# Patient Record
Sex: FEMALE | Race: BLACK OR AFRICAN AMERICAN | NOT HISPANIC OR LATINO | Employment: FULL TIME | URBAN - METROPOLITAN AREA
[De-identification: names, ages, dates, MRNs, and addresses within clinical notes are randomized per-mention and may not be internally consistent; named-entity substitution may affect disease eponyms.]

---

## 2018-12-15 ENCOUNTER — OFFICE VISIT (OUTPATIENT)
Dept: URGENT CARE | Facility: CLINIC | Age: 33
End: 2018-12-15
Payer: MEDICAID

## 2018-12-15 VITALS
TEMPERATURE: 98 F | DIASTOLIC BLOOD PRESSURE: 94 MMHG | HEART RATE: 78 BPM | OXYGEN SATURATION: 97 % | WEIGHT: 190 LBS | BODY MASS INDEX: 31.65 KG/M2 | SYSTOLIC BLOOD PRESSURE: 156 MMHG | RESPIRATION RATE: 18 BRPM | HEIGHT: 65 IN

## 2018-12-15 DIAGNOSIS — B35.4 RINGWORM OF BODY: Primary | ICD-10-CM

## 2018-12-15 DIAGNOSIS — R05.9 COUGH: ICD-10-CM

## 2018-12-15 DIAGNOSIS — H65.90 FLUID COLLECTION OF MIDDLE EAR: ICD-10-CM

## 2018-12-15 PROCEDURE — 99213 OFFICE O/P EST LOW 20 MIN: CPT | Mod: S$GLB,,, | Performed by: NURSE PRACTITIONER

## 2018-12-15 RX ORDER — FLUCONAZOLE 100 MG/1
100 TABLET ORAL DAILY
Qty: 5 TABLET | Refills: 0 | Status: SHIPPED | OUTPATIENT
Start: 2018-12-15 | End: 2022-10-17

## 2018-12-15 RX ORDER — LEVOCETIRIZINE DIHYDROCHLORIDE 5 MG/1
5 TABLET, FILM COATED ORAL NIGHTLY
Qty: 30 TABLET | Refills: 11 | Status: SHIPPED | OUTPATIENT
Start: 2018-12-15 | End: 2022-10-17

## 2018-12-15 RX ORDER — BENZONATATE 100 MG/1
100 CAPSULE ORAL 3 TIMES DAILY PRN
Qty: 30 CAPSULE | Refills: 1 | Status: SHIPPED | OUTPATIENT
Start: 2018-12-15 | End: 2019-12-15

## 2018-12-15 RX ORDER — HYDROCORTISONE 25 MG/G
CREAM TOPICAL
Refills: 1 | COMMUNITY
Start: 2018-11-05 | End: 2024-02-28

## 2018-12-15 RX ORDER — AMLODIPINE BESYLATE 5 MG/1
TABLET ORAL
Refills: 0 | COMMUNITY
Start: 2018-10-10

## 2018-12-15 RX ORDER — CLOTRIMAZOLE 1 %
CREAM (GRAM) TOPICAL
Refills: 1 | COMMUNITY
Start: 2018-11-05 | End: 2024-02-28

## 2018-12-15 RX ORDER — KETOCONAZOLE 20 MG/G
CREAM TOPICAL
Qty: 30 G | Refills: 1 | Status: SHIPPED | OUTPATIENT
Start: 2018-12-15 | End: 2022-10-17

## 2018-12-15 NOTE — PROGRESS NOTES
"Subjective:       Patient ID: Cecilia De Los Santos is a 33 y.o. female.    Vitals:  height is 5' 5" (1.651 m) and weight is 86.2 kg (190 lb). Her respiration is 18.     Chief Complaint: Ear Fullness (Right Ear ) and Extremity Weakness    Patient states that she was on a plane and has been dealing with ear pressure since. Patient is also complaining of weakness in her right arm, tingling and numbness in arm. Patient states that she does have cold symptoms, but she does not feel right. Patient states that she does not have any facial weakness, or slurred speech. Patient states that she is drained, and has been falling sleep.   Has been taking otc cough medication.  Also has rash on trunk of body      Ear Fullness    There is pain in both ears. This is a new problem. Episode onset: Sunday  The problem occurs constantly. The problem has been gradually worsening. There has been no fever. The pain is at a severity of 0/10. The patient is experiencing no pain. Associated symptoms include coughing, a sore throat and vomiting. Pertinent negatives include no diarrhea, headaches or rash. She has tried nothing for the symptoms. The treatment provided no relief.   Extremity Weakness    The pain is present in the left arm. This is a new problem. The current episode started yesterday. There has been no history of extremity trauma. The problem occurs constantly. The problem has been unchanged. Quality: Numbness  The pain is at a severity of 0/10. The patient is experiencing no pain. Associated symptoms include numbness and tingling. Pertinent negatives include no fever. She has tried nothing for the symptoms. The treatment provided no relief.       Constitution: Negative for chills, fatigue and fever.   HENT: Positive for ear pain, tinnitus, congestion and sore throat.    Neck: Negative for painful lymph nodes.   Cardiovascular: Negative for chest pain and leg swelling.   Eyes: Negative for double vision and blurred vision. "   Respiratory: Positive for cough and sputum production. Negative for shortness of breath.    Gastrointestinal: Positive for vomiting. Negative for nausea and diarrhea.   Genitourinary: Negative for dysuria, frequency, urgency and history of kidney stones.   Musculoskeletal: Negative for joint pain, joint swelling, muscle cramps and muscle ache.   Skin: Negative for color change, pale, rash and bruising.   Allergic/Immunologic: Negative for seasonal allergies.   Neurological: Positive for numbness. Negative for dizziness, history of vertigo, light-headedness, passing out and headaches.   Hematologic/Lymphatic: Negative for swollen lymph nodes.   Psychiatric/Behavioral: Negative for nervous/anxious, sleep disturbance and depression. The patient is not nervous/anxious.        Objective:      Physical Exam   Constitutional: She is oriented to person, place, and time. She appears well-developed and well-nourished. She is cooperative.  Non-toxic appearance. She does not appear ill. No distress.   HENT:   Head: Normocephalic and atraumatic.   Right Ear: Hearing, tympanic membrane, external ear and ear canal normal.   Left Ear: Hearing, external ear and ear canal normal. A middle ear effusion is present.   Nose: Nose normal. No mucosal edema, rhinorrhea or nasal deformity. No epistaxis. Right sinus exhibits no maxillary sinus tenderness and no frontal sinus tenderness. Left sinus exhibits no maxillary sinus tenderness and no frontal sinus tenderness.   Mouth/Throat: Uvula is midline, oropharynx is clear and moist and mucous membranes are normal. No trismus in the jaw. Normal dentition. No uvula swelling. No posterior oropharyngeal erythema.   Eyes: Conjunctivae and lids are normal. Right eye exhibits no discharge. Left eye exhibits no discharge. No scleral icterus.   Sclera clear bilat   Neck: Trachea normal, normal range of motion, full passive range of motion without pain and phonation normal. Neck supple.    Cardiovascular: Normal rate, regular rhythm, normal heart sounds, intact distal pulses and normal pulses.   Pulmonary/Chest: Effort normal and breath sounds normal. No respiratory distress.   Abdominal: Soft. Normal appearance and bowel sounds are normal. She exhibits no distension, no pulsatile midline mass and no mass. There is no tenderness.   Musculoskeletal: Normal range of motion. She exhibits no edema or deformity.   Neurological: She is alert and oriented to person, place, and time. She exhibits normal muscle tone. Coordination normal.   Skin: Skin is warm, dry and intact. She is not diaphoretic. No pallor.        Psychiatric: She has a normal mood and affect. Her speech is normal and behavior is normal. Judgment and thought content normal. Cognition and memory are normal.   Nursing note and vitals reviewed.      Assessment:           Plan:         Cecilia was seen today for ear fullness and extremity weakness.    Diagnoses and all orders for this visit:    Ringworm of body    Cough    Fluid collection of middle ear    Other orders  -     fluconazole (DIFLUCAN) 100 MG tablet; Take 1 tablet (100 mg total) by mouth once daily.  -     benzonatate (TESSALON PERLES) 100 MG capsule; Take 1 capsule (100 mg total) by mouth 3 (three) times daily as needed for Cough.  -     levocetirizine (XYZAL) 5 MG tablet; Take 1 tablet (5 mg total) by mouth every evening.  -     ketoconazole (NIZORAL) 2 % cream; Apply to affected area daily        Education  Pt has been given instructions populated from [employer] database and those entered into patient instructions field and has verbalized understanding of the after visit summary and information contained wherein.    Follow Up  No Follow-up on file..    In Case of Emergency   May go to ER for acute shortness of breath, lightheadedness, fever, or any other emergent complaints or changes in condition.

## 2018-12-15 NOTE — PATIENT INSTRUCTIONS
Ringworm of the Skin    Ringworm is a fungal infection of the skin. Despite the name, a worm doesn't cause it. The cause of ringworm is a fungus that infects the outer layers of the skin. It is also not caused by bed bugs, scabies, or lice. These are totally different.  The medical term for ringworm is tinea. It can affect most parts of your body, although it seems to do better in moist areas of the body and around hair. It can be on almost any part of your body, including:  · Arms, hands, legs, chest, feet, and back  · Scalp  · Beard  · Groin  · Between the toes  Depending on where it is located, sometimes the name changes:  · Tinea capitis (scalp)  · Tinea cruris (groin)  · Tinea corporis (body)  · Tinea pedis (feet)  Causes  Ringworm is very common all over the world, including the [place] It can take less than 1 week up to 2 weeks before you develop the infection after being exposed. So, you may not figure out the exact cause.  It is spread through direct contact with:  · An infected person or animal  · Infected soil, or objects such as towels, clothing, and boothe  Symptoms  At first you might not notice ringworm. Or you may just see a small, red, often raised itchy spot or pimple. Sometimes there may only be one spot. At other times there may be several. Ringworm can look slightly different on different parts of the body, but there are some things are always present:  · Irregular, round, oval or ring-shaped, which is why it's called ringworm  · Clearer or lighter color at the center, since it spreads from the center of the spot outward  · Red or inflamed look  · Raised  · Itchy  · Scaly, dry, or flaky  Home care  Follow these tips to help care for yourself at home:  · Leave it alone. Don't scratch at the rash or pick it. This can increase the chance of infection and scarring.  · Take medicine as prescribed. If you were prescribed a cream, apply it exactly as directed. Make sure to put the cream not just on the  rash, but also on the skin 1 or 2 inches around it. Medicine by mouth is sometimes needed, particularly for ringworm on the scalp. Take it as directed and until your healthcare provider says to stop.  · Keep it from spreading to others. Untreated ringworm of the skin is contagious by skin-to-skin contact. Your child may return to school 2 days after treatment has started.  Prevention  To some degree, prevention depends on what part of your body was affected. In general, the following good hygiene can help.  · Clean up after you get dirty or sweaty, or after using a locker room.  · When possible, dont share boothe and brushes.  · Avoid having your skin and feet wet or damp for long periods.  · Wear clean, loose-fitting underwear.  Follow-up care  Follow up with your healthcare provider as advised by our staff if the rash does not improve after 10 days of treatment or if the rash spreads to other areas of the body.  When to seek medical advice  Call your healthcare provider right away if any of these occur:  · Redness around the rash gets worse  · Fluid drains from the rash  · Fever of 100.4ºF (38ºC) or higher, or as directed by your healthcare provider  Date Last Reviewed: 8/1/2016  © 6180-4890 [employer]. [address] [place] [address], [place] [address]. All rights reserved. This information is not intended as a substitute for professional medical care. Always follow your healthcare professional's instructions.        Common Middle Ear Problems    Your middle ear may have been injured or infected recently. Over time, certain growths or bone disease can also harm the middle ear. Left untreated, middle ear problems often lead to lifelong hearing loss. There are two types of hearing loss: conductive and sensorineural. One or both kinds can occur. Injury, infection, certain growths, or bone disease can cause your symptoms. A ruptured eardrum or a long-lasting (chronic) ear infection may be painful and decrease  hearing.  Symptoms  · Hearing loss in one or both ears  · Fluid, often smelly, draining from the ear  · Pain, pressure, or discomfort in the ear  · Ringing in the ear  Conductive and sensorineural hearing loss  Sound waves may be disrupted before they reach the inner ear. If this happens, conductive hearing loss may occur. The ear canal can be blocked by wax, infection, a tumor, or a foreign object. The eardrum can be injured or infected. Abnormal bone growth, infection, or tumors in the middle ear can block sound waves.  Sound waves may not be processed correctly in the inner ear. If this happens, sensorineural hearing loss may occur. Permanent hearing loss is most commonly associated with sensorineural problems.  The tests and evaluations used to diagnose what type of hearing problem you have will depend on your symptoms.   Date Last Reviewed: 10/1/2016  © 0510-2500 [employer]. [address] [place] [address], [place] [address]. All rights reserved. This information is not intended as a substitute for professional medical care. Always follow your healthcare professional's instructions.

## 2019-11-26 ENCOUNTER — OFFICE VISIT (OUTPATIENT)
Dept: URGENT CARE | Facility: CLINIC | Age: 34
End: 2019-11-26
Payer: MEDICAID

## 2019-11-26 VITALS
DIASTOLIC BLOOD PRESSURE: 93 MMHG | WEIGHT: 190 LBS | BODY MASS INDEX: 31.65 KG/M2 | HEART RATE: 75 BPM | OXYGEN SATURATION: 100 % | TEMPERATURE: 98 F | SYSTOLIC BLOOD PRESSURE: 150 MMHG | HEIGHT: 65 IN | RESPIRATION RATE: 18 BRPM

## 2019-11-26 DIAGNOSIS — R30.0 DYSURIA: ICD-10-CM

## 2019-11-26 DIAGNOSIS — R21 RASH: ICD-10-CM

## 2019-11-26 DIAGNOSIS — I10 ESSENTIAL HYPERTENSION: ICD-10-CM

## 2019-11-26 DIAGNOSIS — N30.00 ACUTE CYSTITIS WITHOUT HEMATURIA: ICD-10-CM

## 2019-11-26 DIAGNOSIS — R35.0 FREQUENCY OF URINATION: Primary | ICD-10-CM

## 2019-11-26 LAB
BILIRUB UR QL STRIP: NEGATIVE
GLUCOSE UR QL STRIP: NEGATIVE
KETONES UR QL STRIP: NEGATIVE
LEUKOCYTE ESTERASE UR QL STRIP: POSITIVE
PH, POC UA: 7.5 (ref 5–8)
POC BLOOD, URINE: POSITIVE
POC NITRATES, URINE: NEGATIVE
PROT UR QL STRIP: NEGATIVE
SP GR UR STRIP: 1.02 (ref 1–1.03)
UROBILINOGEN UR STRIP-ACNC: ABNORMAL (ref 0.1–1.1)

## 2019-11-26 PROCEDURE — 99214 PR OFFICE/OUTPT VISIT, EST, LEVL IV, 30-39 MIN: ICD-10-PCS | Mod: 25,S$GLB,, | Performed by: FAMILY MEDICINE

## 2019-11-26 PROCEDURE — 81003 POCT URINALYSIS, DIPSTICK, AUTOMATED, W/O SCOPE: ICD-10-PCS | Mod: QW,S$GLB,, | Performed by: FAMILY MEDICINE

## 2019-11-26 PROCEDURE — 81003 URINALYSIS AUTO W/O SCOPE: CPT | Mod: QW,S$GLB,, | Performed by: FAMILY MEDICINE

## 2019-11-26 PROCEDURE — 99214 OFFICE O/P EST MOD 30 MIN: CPT | Mod: 25,S$GLB,, | Performed by: FAMILY MEDICINE

## 2019-11-26 RX ORDER — LISINOPRIL 10 MG/1
10 TABLET ORAL DAILY
Qty: 90 TABLET | Refills: 3 | Status: SHIPPED | OUTPATIENT
Start: 2019-11-26 | End: 2024-02-28

## 2019-11-26 RX ORDER — DEXAMETHASONE SODIUM PHOSPHATE 100 MG/10ML
10 INJECTION INTRAMUSCULAR; INTRAVENOUS
Status: COMPLETED | OUTPATIENT
Start: 2019-11-26 | End: 2019-11-26

## 2019-11-26 RX ORDER — TRIAMCINOLONE ACETONIDE 1 MG/G
CREAM TOPICAL 2 TIMES DAILY
Qty: 80 G | Refills: 1 | Status: SHIPPED | OUTPATIENT
Start: 2019-11-26 | End: 2024-02-28

## 2019-11-26 RX ORDER — NITROFURANTOIN 25; 75 MG/1; MG/1
100 CAPSULE ORAL 2 TIMES DAILY
Qty: 14 CAPSULE | Refills: 0 | Status: SHIPPED | OUTPATIENT
Start: 2019-11-26 | End: 2019-12-03

## 2019-11-26 RX ADMIN — DEXAMETHASONE SODIUM PHOSPHATE 10 MG: 100 INJECTION INTRAMUSCULAR; INTRAVENOUS at 07:11

## 2019-11-27 NOTE — PROGRESS NOTES
"Subjective:       Patient ID: eCcilia De Los Santos is a 34 y.o. female.    Vitals:  height is 5' 5" (1.651 m) and weight is 86.2 kg (190 lb). Her temperature is 98.3 °F (36.8 °C). Her blood pressure is 150/93 (abnormal) and her pulse is 75. Her respiration is 18 and oxygen saturation is 100%.     Chief Complaint: Hypertension    Pt is also having some other symptoms like rash, urine frequency, and headaches. She said she is also forgetting things and is fatigued.  Patient does admit stress at present  34-year-old black female with complains of a rash on the face for last few days itching also has a rash on the abdomen and a right lower leg which has been present for over 2 weeks no itching or pain at that he does areas patient also complains of her blood pressure has been elevated and feels some headache in unusual feeling denies any nausea vomiting denies any chest pain patient also complains of having urgency and frequency in urination for last couple days denies any vaginal discharge patient is sexually inactive     Hypertension   This is a new problem. The current episode started yesterday. The problem is unchanged. The problem is controlled. Associated symptoms include headaches. Pertinent negatives include no blurred vision, chest pain or shortness of breath. There are no associated agents to hypertension. Treatments tried: amlodipine. The current treatment provides mild improvement. There are no compliance problems.        Constitution: Positive for fatigue. Negative for chills and fever.   HENT: Negative for congestion and sore throat.    Neck: Negative for painful lymph nodes.   Cardiovascular: Negative for chest pain and leg swelling.   Eyes: Negative for double vision and blurred vision.   Respiratory: Negative for cough and shortness of breath.    Gastrointestinal: Negative for nausea, vomiting and diarrhea.   Genitourinary: Positive for frequency and vaginal odor. Negative for dysuria, urgency and history " of kidney stones.   Musculoskeletal: Negative for joint pain, joint swelling, muscle cramps and muscle ache.   Skin: Positive for rash. Negative for color change, pale and bruising.   Allergic/Immunologic: Negative for seasonal allergies.   Neurological: Positive for headaches. Negative for dizziness, history of vertigo, light-headedness and passing out.   Hematologic/Lymphatic: Negative for swollen lymph nodes.   Psychiatric/Behavioral: Negative for nervous/anxious, sleep disturbance and depression. The patient is not nervous/anxious.        Objective:      Physical Exam   Constitutional: She is oriented to person, place, and time. She appears well-developed and well-nourished. She is cooperative.  Non-toxic appearance. She does not appear ill. No distress.   HENT:   Head: Normocephalic and atraumatic.   Right Ear: Hearing, tympanic membrane, external ear and ear canal normal.   Left Ear: Hearing, tympanic membrane, external ear and ear canal normal.   Nose: Nose normal. No mucosal edema, rhinorrhea or nasal deformity. No epistaxis. Right sinus exhibits no maxillary sinus tenderness and no frontal sinus tenderness. Left sinus exhibits no maxillary sinus tenderness and no frontal sinus tenderness.   Mouth/Throat: Uvula is midline, oropharynx is clear and moist and mucous membranes are normal. No trismus in the jaw. Normal dentition. No uvula swelling. No posterior oropharyngeal erythema.   Eyes: Conjunctivae and lids are normal. Right eye exhibits no discharge. Left eye exhibits no discharge. No scleral icterus.   Neck: Trachea normal, normal range of motion, full passive range of motion without pain and phonation normal. Neck supple.   Cardiovascular: Normal rate, regular rhythm, normal heart sounds, intact distal pulses and normal pulses.   Pulmonary/Chest: Effort normal and breath sounds normal. No respiratory distress.   Abdominal: Soft. Normal appearance and bowel sounds are normal. She exhibits no distension,  no pulsatile midline mass and no mass. There is no tenderness.   Musculoskeletal: Normal range of motion. She exhibits no edema or deformity.   Neurological: She is alert and oriented to person, place, and time. She exhibits normal muscle tone. Coordination normal.   Skin: Skin is warm, dry, intact, not diaphoretic and not pale.   Mild papular rash on both cheek areas non vesicular left side of lower abdomen with the scabbed rash right lower leg with excoriated rash no burrows  No itching on the extremities but does admit itching sensation on the face   Psychiatric: She has a normal mood and affect. Her speech is normal and behavior is normal. Judgment and thought content normal. Cognition and memory are normal.   Nursing note and vitals reviewed.        Assessment:       1. Frequency of urination    2. Dysuria    3. Acute cystitis without hematuria    4. Rash    5. Essential hypertension        Plan:         Frequency of urination  -     POCT Urinalysis, Dipstick, Automated, W/O Scope    Dysuria  -     POCT Urinalysis, Dipstick, Automated, W/O Scope  -     nitrofurantoin, macrocrystal-monohydrate, (MACROBID) 100 MG capsule; Take 1 capsule (100 mg total) by mouth 2 (two) times daily. for 7 days  Dispense: 14 capsule; Refill: 0    Acute cystitis without hematuria  -     POCT Urinalysis, Dipstick, Automated, W/O Scope  -     nitrofurantoin, macrocrystal-monohydrate, (MACROBID) 100 MG capsule; Take 1 capsule (100 mg total) by mouth 2 (two) times daily. for 7 days  Dispense: 14 capsule; Refill: 0    Rash  -     dexamethasone injection 10 mg    Essential hypertension  -     lisinopril 10 MG tablet; Take 1 tablet (10 mg total) by mouth once daily.  Dispense: 90 tablet; Refill: 3          Results for orders placed or performed in visit on 11/26/19   POCT Urinalysis, Dipstick, Automated, W/O Scope   Result Value Ref Range    POC Blood, Urine Positive (A) Negative    POC Bilirubin, Urine Negative Negative    POC  Urobilinogen, Urine norm 0.1 - 1.1    POC Ketones, Urine Negative Negative    POC Protein, Urine Negative Negative    POC Nitrates, Urine Negative Negative    POC Glucose, Urine Negative Negative    pH, UA 7.5 5 - 8    POC Specific Gravity, Urine 1.020 1.003 - 1.029    POC Leukocytes, Urine Positive (A) Negative        Pt advised to follow up with PCP

## 2019-11-27 NOTE — PATIENT INSTRUCTIONS
Established High Blood Pressure    High blood pressure (hypertension) is a chronic disease. Often, healthcare providers dont know what causes it. But it can be caused by certain health conditions and medicines.  If you have high blood pressure, you may not have any symptoms. If you do have symptoms, they may include headache, dizziness, changes in your vision, chest pain, and shortness of breath. But even without symptoms, high blood pressure thats not treated raises your risk for heart attack and stroke. High blood pressure is a serious health risk and shouldnt be ignored.  A blood pressure reading is made up of two numbers: a higher number over a lower number. The top number is the systolic pressure. The bottom number is the diastolic pressure. A normal blood pressure is a systolic pressure of  less than 120 over a diastolic pressure of less than 80. You will see your blood pressure readings written together. For example, a person with a systolic pressure of 188 and a diastolic pressure of 78 will have 118/78 written in the medical record.  High blood pressure is when either the top number is 140 or higher, or the bottom number is 90 or higher. This must be the result when taking your blood pressure a number of times. The blood pressures between normal and high are called prehypertension.  Home care  If you have high blood pressure, you should do what is listed below to lower your blood pressure. If you are taking medicines for high blood pressure, these methods may reduce or end your need for medicines in the future.  · Begin a weight-loss program if you are overweight.  · Cut back on how much salt you get in your diet. Heres how to do this:  ¨ Dont eat foods that have a lot of salt. These include olives, pickles, smoked meats, and salted potato chips.  ¨ Dont add salt to your food at the table.  ¨ Use only small amounts of salt when cooking.  · Start an exercise program. Talk with your healthcare  provider about the type of exercise program that would be best for you. It doesn't have to be hard. Even brisk walking for 20 minutes 3 times a week is a good form of exercise.  · Dont take medicines that stimulate the heart. This includes many over-the-counter cold and sinus decongestant pills and sprays, as well as diet pills. Check the warnings about hypertension on the label. Before buying any over-the-counter medicines or supplements, always ask the pharmacist about the product's potential interaction with your high blood pressure and your high blood pressure medicines.  · Stimulants such as amphetamine or cocaine could be deadly for someone with high blood pressure. Never take these.  · Limit how much caffeine you get in your diet. Switch to caffeine-free products.  · Stop smoking. If you are a long-time smoker, this can be hard. Talk to your healthcare provider about medicines and nicotine replacement options to help you. Also, enroll in a stop-smoking program to make it more likely that you will quit for good.  · Learn how to handle stress. This is an important part of any program to lower blood pressure. Learn about relaxation methods like meditation, yoga, or biofeedback.  · If your provider prescribed medicines, take them exactly as directed. Missing doses may cause your blood pressure get out of control.  · If you miss a dose or doses, check with your healthcare provider or pharmacist about what to do.  · Consider buying an automatic blood pressure machine. Ask your provider for a recommendation. You can get one of these at most pharmacies.     The American Heart Association recommends the following guidelines for home blood pressure monitoring:  · Don't smoke or drink coffee for 30 minutes before taking your blood pressure.  · Go to the bathroom before the test.  · Relax for 5 minutes before taking the measurement.  · Sit with your back supported (don't sit on a couch or soft chair); keep your feet on  the floor uncrossed. Place your arm on a solid flat surface (like a table) with the upper part of the arm at heart level. Place the middle of the cuff directly above the eye of the elbow. Check the monitor's instruction manual for an illustration.  · Take multiple readings. When you measure, take 2 to 3 readings one minute apart and record all of the results.  · Take your blood pressure at the same time every day, or as your healthcare provider recommends.  · Record the date, time, and blood pressure reading.  · Take the record with you to your next medical appointment. If your blood pressure monitor has a built-in memory, simply take the monitor with you to your next appointment.  · Call your provider if you have several high readings. Don't be frightened by a single high blood pressure reading, but if you get several high readings, check in with your healthcare provider.  · Note: When blood pressure reaches a systolic (top number) of 180 or higher OR diastolic (bottom number) of 110 or higher, seek emergency medical treatment.  Follow-up care  You will need to see your healthcare provider regularly. This is to check your blood pressure and to make changes to your medicines. Make a follow-up appointment as directed. Bring the record of your home blood pressure readings to the appointment.  When to seek medical advice  Call your healthcare provider right away if any of these occur:  · Blood pressure reaches a systolic (upper number) of 180 or higher OR a diastolic (bottom number) of 110 or higher  · Chest pain or shortness of breath  · Severe headache  · Throbbing or rushing sound in the ears  · Nosebleed  · Sudden severe pain in your belly (abdomen)  · Extreme drowsiness, confusion, or fainting  · Dizziness or spinning sensation (vertigo)  · Weakness of an arm or leg or one side of the face  · You have problems speaking or seeing   Date Last Reviewed: 12/1/2016  © 7930-7604 [employer]. [address] [place] [address]  [place] [address]. All rights reserved. This information is not intended as a substitute for professional medical care. Always follow your healthcare professional's instructions.

## 2020-02-17 ENCOUNTER — TELEPHONE (OUTPATIENT)
Dept: OPHTHALMOLOGY | Facility: CLINIC | Age: 35
End: 2020-02-17

## 2020-02-17 NOTE — TELEPHONE ENCOUNTER
Left vm and call back number   We do not accept her ins  Received ref from dr walter  Relayed msg to omar at dr walter that we do not accept her ins for cross linking

## 2022-01-08 ENCOUNTER — OFFICE VISIT (OUTPATIENT)
Dept: URGENT CARE | Facility: CLINIC | Age: 37
End: 2022-01-08
Payer: MEDICAID

## 2022-01-08 VITALS
OXYGEN SATURATION: 95 % | TEMPERATURE: 96 F | DIASTOLIC BLOOD PRESSURE: 90 MMHG | SYSTOLIC BLOOD PRESSURE: 153 MMHG | HEIGHT: 66 IN | WEIGHT: 211 LBS | BODY MASS INDEX: 33.91 KG/M2 | HEART RATE: 89 BPM | RESPIRATION RATE: 20 BRPM

## 2022-01-08 DIAGNOSIS — Z20.822 EXPOSURE TO COVID-19 VIRUS: Primary | ICD-10-CM

## 2022-01-08 LAB
CTP QC/QA: YES
SARS-COV-2 RDRP RESP QL NAA+PROBE: NEGATIVE

## 2022-01-08 PROCEDURE — 3080F DIAST BP >= 90 MM HG: CPT | Mod: CPTII,S$GLB,, | Performed by: FAMILY MEDICINE

## 2022-01-08 PROCEDURE — 1159F MED LIST DOCD IN RCRD: CPT | Mod: CPTII,S$GLB,, | Performed by: FAMILY MEDICINE

## 2022-01-08 PROCEDURE — 3080F PR MOST RECENT DIASTOLIC BLOOD PRESSURE >= 90 MM HG: ICD-10-PCS | Mod: CPTII,S$GLB,, | Performed by: FAMILY MEDICINE

## 2022-01-08 PROCEDURE — 1159F PR MEDICATION LIST DOCUMENTED IN MEDICAL RECORD: ICD-10-PCS | Mod: CPTII,S$GLB,, | Performed by: FAMILY MEDICINE

## 2022-01-08 PROCEDURE — 1160F PR REVIEW ALL MEDS BY PRESCRIBER/CLIN PHARMACIST DOCUMENTED: ICD-10-PCS | Mod: CPTII,S$GLB,, | Performed by: FAMILY MEDICINE

## 2022-01-08 PROCEDURE — 3008F PR BODY MASS INDEX (BMI) DOCUMENTED: ICD-10-PCS | Mod: CPTII,S$GLB,, | Performed by: FAMILY MEDICINE

## 2022-01-08 PROCEDURE — 3077F SYST BP >= 140 MM HG: CPT | Mod: CPTII,S$GLB,, | Performed by: FAMILY MEDICINE

## 2022-01-08 PROCEDURE — U0002: ICD-10-PCS | Mod: QW,S$GLB,, | Performed by: FAMILY MEDICINE

## 2022-01-08 PROCEDURE — U0002 COVID-19 LAB TEST NON-CDC: HCPCS | Mod: QW,S$GLB,, | Performed by: FAMILY MEDICINE

## 2022-01-08 PROCEDURE — 1160F RVW MEDS BY RX/DR IN RCRD: CPT | Mod: CPTII,S$GLB,, | Performed by: FAMILY MEDICINE

## 2022-01-08 PROCEDURE — 99213 PR OFFICE/OUTPT VISIT, EST, LEVL III, 20-29 MIN: ICD-10-PCS | Mod: S$GLB,,, | Performed by: FAMILY MEDICINE

## 2022-01-08 PROCEDURE — 3077F PR MOST RECENT SYSTOLIC BLOOD PRESSURE >= 140 MM HG: ICD-10-PCS | Mod: CPTII,S$GLB,, | Performed by: FAMILY MEDICINE

## 2022-01-08 PROCEDURE — 99213 OFFICE O/P EST LOW 20 MIN: CPT | Mod: S$GLB,,, | Performed by: FAMILY MEDICINE

## 2022-01-08 PROCEDURE — 3008F BODY MASS INDEX DOCD: CPT | Mod: CPTII,S$GLB,, | Performed by: FAMILY MEDICINE

## 2022-01-08 NOTE — PATIENT INSTRUCTIONS
 CDC Testing and Quarantine Guidelines for patients with exposure to a known-positive COVID-19 person:   A 'close exposure' is defined as anyone who has had an exposure (masked or unmasked) to a known COVID -19 positive person    within 6 feet of someone    for a cumulative total of 15 minutes or more over a 24-hour period.    vaccinated and/or had a positive test within 90 days    do NOT need to quarantine after contact with someone who had COVID-19 unless they have symptoms.    fully vaccinated people who have not had a positive test within 90 days, should get tested 3-5 days after their exposure, even if they don't have symptoms and wear a mask indoors in public for 10 days following exposure or until their test result is negative.      Unvaccinated, or are more than six months out from their second mRNA dose (or more than 2 months after the J&J vaccine) and not yet boosted,  and/or NOT had a positive test within 90 days and meet 'close exposure'   you are required by CDC guidelines to quarantine for at least 5 days from time of exposure followed by 5 days of strict masking. It is recommended, but not required to test after 5 days, unless you develop symptoms, in which case you should test at that time.   If you do decide to test at 5 days and are asymptomatic, the risk is that if you test without symptoms on Day 5 for example) and you are positive, your 5 day isolation begins on that day, and you turned your 5 day quarantine into 10 days.   If your exposure does not meet the above definition, you can return to your normal daily activities to include social distancing, wearing a mask and frequent handwashing.   Alternatively, if a 5-day quarantine is not feasible, it is imperative that an exposed person wear a well-fitting mask at all times when around others for 10 days after exposure..

## 2022-01-08 NOTE — PROGRESS NOTES
"Subjective:       Patient ID: Cecilia De Los Santos is a 36 y.o. female.    Vitals:  height is 5' 6" (1.676 m) and weight is 95.7 kg (211 lb). Her temperature is 96.3 °F (35.7 °C). Her blood pressure is 153/90 (abnormal) and her pulse is 89. Her respiration is 20 and oxygen saturation is 95%.     Chief Complaint: Cough    Close exposure to COVId    C/o cough, HA    Cough  This is a new problem. The current episode started in the past 7 days. The problem has been unchanged. The cough is non-productive. Associated symptoms include headaches. Pertinent negatives include no chest pain, chills, ear congestion, ear pain, fever, heartburn, hemoptysis, myalgias, nasal congestion, postnasal drip, rash, rhinorrhea, sore throat, shortness of breath, sweats, weight loss or wheezing. She has tried nothing for the symptoms. There is no history of asthma, bronchiectasis, bronchitis, COPD, emphysema, environmental allergies or pneumonia.       Constitution: Negative for chills and fever.   HENT: Negative for ear pain, postnasal drip and sore throat.    Cardiovascular: Negative for chest pain.   Respiratory: Positive for cough. Negative for bloody sputum, shortness of breath and wheezing.    Gastrointestinal: Negative for heartburn.   Musculoskeletal: Negative for muscle ache.   Skin: Negative for rash.   Allergic/Immunologic: Negative for environmental allergies.   Neurological: Positive for headaches.       Objective:      Physical Exam   Constitutional: She does not appear ill. No distress. obesity  HENT:   Head: Normocephalic and atraumatic.   Nose: Nose normal.   Cardiovascular: Normal rate, regular rhythm, normal heart sounds and normal pulses.   Pulmonary/Chest: Effort normal.   Abdominal: Normal appearance. Soft.   Neurological: She is alert.   Nursing note and vitals reviewed.        Assessment:       1. Exposure to COVID-19 virus        Patient is fully vaccinated and aside from occasional headaches is asymptomatic. No " quarantine apparently indicated.   Plan:         Exposure to COVID-19 virus  -     POCT COVID-19 Rapid Screening

## 2022-01-12 ENCOUNTER — LAB VISIT (OUTPATIENT)
Dept: PRIMARY CARE CLINIC | Facility: CLINIC | Age: 37
End: 2022-01-12
Payer: MEDICAID

## 2022-01-12 DIAGNOSIS — Z20.822 CONTACT WITH AND (SUSPECTED) EXPOSURE TO COVID-19: ICD-10-CM

## 2022-01-12 LAB
CTP QC/QA: YES
SARS-COV-2 AG RESP QL IA.RAPID: NEGATIVE

## 2022-01-12 PROCEDURE — 87811 SARS-COV-2 COVID19 W/OPTIC: CPT

## 2022-10-17 ENCOUNTER — OFFICE VISIT (OUTPATIENT)
Dept: URGENT CARE | Facility: CLINIC | Age: 37
End: 2022-10-17
Payer: MEDICAID

## 2022-10-17 VITALS
DIASTOLIC BLOOD PRESSURE: 80 MMHG | OXYGEN SATURATION: 98 % | SYSTOLIC BLOOD PRESSURE: 140 MMHG | HEIGHT: 65 IN | HEART RATE: 101 BPM | RESPIRATION RATE: 20 BRPM | WEIGHT: 210 LBS | TEMPERATURE: 97 F | BODY MASS INDEX: 34.99 KG/M2

## 2022-10-17 DIAGNOSIS — J02.9 SORE THROAT: ICD-10-CM

## 2022-10-17 DIAGNOSIS — R39.9 UTI SYMPTOMS: ICD-10-CM

## 2022-10-17 DIAGNOSIS — R35.0 URINARY FREQUENCY: ICD-10-CM

## 2022-10-17 DIAGNOSIS — Z20.828 EXPOSURE TO INFLUENZA: Primary | ICD-10-CM

## 2022-10-17 DIAGNOSIS — R51.9 ACUTE NONINTRACTABLE HEADACHE, UNSPECIFIED HEADACHE TYPE: ICD-10-CM

## 2022-10-17 DIAGNOSIS — R05.9 COUGH, UNSPECIFIED TYPE: ICD-10-CM

## 2022-10-17 LAB
B-HCG UR QL: NEGATIVE
BILIRUB UR QL STRIP: NEGATIVE
CTP QC/QA: YES
CTP QC/QA: YES
GLUCOSE UR QL STRIP: NEGATIVE
KETONES UR QL STRIP: NEGATIVE
LEUKOCYTE ESTERASE UR QL STRIP: NEGATIVE
PH, POC UA: 6.5 (ref 5–8)
POC BLOOD, URINE: POSITIVE
POC NITRATES, URINE: NEGATIVE
PROT UR QL STRIP: NEGATIVE
SARS-COV-2 RDRP RESP QL NAA+PROBE: NEGATIVE
SP GR UR STRIP: 1.02 (ref 1–1.03)
UROBILINOGEN UR STRIP-ACNC: POSITIVE (ref 0.1–1.1)

## 2022-10-17 PROCEDURE — 81025 URINE PREGNANCY TEST: CPT | Mod: S$GLB,,, | Performed by: NURSE PRACTITIONER

## 2022-10-17 PROCEDURE — 81025 POCT URINE PREGNANCY: ICD-10-PCS | Mod: S$GLB,,, | Performed by: NURSE PRACTITIONER

## 2022-10-17 PROCEDURE — 3079F PR MOST RECENT DIASTOLIC BLOOD PRESSURE 80-89 MM HG: ICD-10-PCS | Mod: CPTII,S$GLB,, | Performed by: NURSE PRACTITIONER

## 2022-10-17 PROCEDURE — 3077F SYST BP >= 140 MM HG: CPT | Mod: CPTII,S$GLB,, | Performed by: NURSE PRACTITIONER

## 2022-10-17 PROCEDURE — 1160F RVW MEDS BY RX/DR IN RCRD: CPT | Mod: CPTII,S$GLB,, | Performed by: NURSE PRACTITIONER

## 2022-10-17 PROCEDURE — 99205 PR OFFICE/OUTPT VISIT, NEW, LEVL V, 60-74 MIN: ICD-10-PCS | Mod: S$GLB,,, | Performed by: NURSE PRACTITIONER

## 2022-10-17 PROCEDURE — 1160F PR REVIEW ALL MEDS BY PRESCRIBER/CLIN PHARMACIST DOCUMENTED: ICD-10-PCS | Mod: CPTII,S$GLB,, | Performed by: NURSE PRACTITIONER

## 2022-10-17 PROCEDURE — 1159F MED LIST DOCD IN RCRD: CPT | Mod: CPTII,S$GLB,, | Performed by: NURSE PRACTITIONER

## 2022-10-17 PROCEDURE — 99205 OFFICE O/P NEW HI 60 MIN: CPT | Mod: S$GLB,,, | Performed by: NURSE PRACTITIONER

## 2022-10-17 PROCEDURE — 81003 URINALYSIS AUTO W/O SCOPE: CPT | Mod: QW,S$GLB,, | Performed by: NURSE PRACTITIONER

## 2022-10-17 PROCEDURE — 87635: ICD-10-PCS | Mod: QW,S$GLB,, | Performed by: NURSE PRACTITIONER

## 2022-10-17 PROCEDURE — 3079F DIAST BP 80-89 MM HG: CPT | Mod: CPTII,S$GLB,, | Performed by: NURSE PRACTITIONER

## 2022-10-17 PROCEDURE — 87635 SARS-COV-2 COVID-19 AMP PRB: CPT | Mod: QW,S$GLB,, | Performed by: NURSE PRACTITIONER

## 2022-10-17 PROCEDURE — 81003 POCT URINALYSIS, DIPSTICK, AUTOMATED, W/O SCOPE: ICD-10-PCS | Mod: QW,S$GLB,, | Performed by: NURSE PRACTITIONER

## 2022-10-17 PROCEDURE — 3077F PR MOST RECENT SYSTOLIC BLOOD PRESSURE >= 140 MM HG: ICD-10-PCS | Mod: CPTII,S$GLB,, | Performed by: NURSE PRACTITIONER

## 2022-10-17 PROCEDURE — 1159F PR MEDICATION LIST DOCUMENTED IN MEDICAL RECORD: ICD-10-PCS | Mod: CPTII,S$GLB,, | Performed by: NURSE PRACTITIONER

## 2022-10-17 RX ORDER — OSELTAMIVIR PHOSPHATE 75 MG/1
75 CAPSULE ORAL DAILY
Qty: 10 CAPSULE | Refills: 0 | Status: SHIPPED | OUTPATIENT
Start: 2022-10-17 | End: 2022-10-27

## 2022-10-18 NOTE — PROGRESS NOTES
"Subjective:       Patient ID: Cecilia De Los Santos is a 37 y.o. female.    Vitals:  height is 5' 5" (1.651 m) and weight is 95.3 kg (210 lb). Her tympanic temperature is 97.3 °F (36.3 °C). Her blood pressure is 140/80 (abnormal) and her pulse is 101. Her respiration is 20 and oxygen saturation is 98%.     Chief Complaint: Sore Throat (Watery eyes, headache, nasal congestion) and Urinary Tract Infection    This is a 37 y.o. female who presents today with a chief complaint of cold symptoms that began today after a student sneeze on her, no med's were taken. Pt explain that UTI symptoms began last week.    Patient reports that a student at least on her today and she almost instantly started having weird feelings as if she may have the flu.  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 The current episode started today. The problem has been unchanged. There has been no fever. The fever has been present for Less than 1 day. The pain is at a severity of 0/10. The patient is experiencing no pain. Associated symptoms include headaches. She has tried nothing for the symptoms. The treatment provided no relief.   Urinary Tract Infection   This is a new problem. The current episode started 1 to 4 weeks ago. The problem occurs every urination. The problem has been unchanged. The quality of the pain is described as aching. The pain is at a severity of 0/10. The patient is experiencing no pain. There has been no fever. She is Sexually active. There is No history of pyelonephritis. Associated symptoms include frequency. She has tried nothing for the symptoms. The treatment provided no relief.     HENT:  Positive for sore throat.    Genitourinary:  Positive for frequency.   Neurological:  Positive for headaches.     Objective:      Physical Exam   Constitutional: She is oriented to person, " place, and time. She appears well-developed. She is cooperative.  Non-toxic appearance. She does not appear ill. No distress.   HENT:   Head: Normocephalic and atraumatic.   Ears:   Right Ear: Hearing, tympanic membrane, external ear and ear canal normal.   Left Ear: Hearing, tympanic membrane, external ear and ear canal normal.   Nose: Nose normal. No mucosal edema, rhinorrhea or nasal deformity. No epistaxis. Right sinus exhibits no maxillary sinus tenderness and no frontal sinus tenderness. Left sinus exhibits no maxillary sinus tenderness and no frontal sinus tenderness.   Mouth/Throat: Uvula is midline, oropharynx is clear and moist and mucous membranes are normal. No trismus in the jaw. Normal dentition. No uvula swelling. No oropharyngeal exudate, posterior oropharyngeal edema or posterior oropharyngeal erythema.   Eyes: Conjunctivae and lids are normal. No scleral icterus.   Neck: Trachea normal and phonation normal. Neck supple. No edema present. No erythema present. No neck rigidity present.   Cardiovascular: Normal rate, regular rhythm, normal heart sounds and normal pulses.   Pulmonary/Chest: Effort normal and breath sounds normal. No respiratory distress. She has no decreased breath sounds. She has no rhonchi.   Abdominal: Normal appearance. There is no left CVA tenderness and no right CVA tenderness.   Musculoskeletal: Normal range of motion.         General: No deformity. Normal range of motion.   Neurological: She is alert and oriented to person, place, and time. She exhibits normal muscle tone. Coordination normal.   Skin: Skin is warm, dry, intact, not diaphoretic and not pale.   Psychiatric: Her speech is normal and behavior is normal. Judgment and thought content normal.   Nursing note and vitals reviewed.      Results for orders placed or performed in visit on 10/17/22   POCT COVID-19 Rapid Screening   Result Value Ref Range    POC Rapid COVID Negative Negative     Acceptable Yes     POCT Urinalysis, Dipstick, Automated, W/O Scope   Result Value Ref Range    POC Blood, Urine Positive (A) Negative    POC Bilirubin, Urine Negative Negative    POC Urobilinogen, Urine Positive 0.1 - 1.1    POC Ketones, Urine Negative Negative    POC Protein, Urine Negative Negative    POC Nitrates, Urine Negative Negative    POC Glucose, Urine Negative Negative    pH, UA 6.5 5 - 8    POC Specific Gravity, Urine 1.020 1.003 - 1.029    POC Leukocytes, Urine Negative Negative   POCT urine pregnancy   Result Value Ref Range    POC Preg Test, Ur Negative Negative     Acceptable Yes        Assessment:       1. Exposure to influenza    2. Urinary frequency    3. UTI symptoms    4. Sore throat    5. Acute nonintractable headache, unspecified headache type    6. Cough, unspecified type            Plan:         Exposure to influenza  -     oseltamivir (TAMIFLU) 75 MG capsule; Take 1 capsule (75 mg total) by mouth once daily. for 10 days  Dispense: 10 capsule; Refill: 0    Urinary frequency  -     POCT Urinalysis, Dipstick, Automated, W/O Scope  -     POCT urine pregnancy    UTI symptoms    Sore throat    Acute nonintractable headache, unspecified headache type    Cough, unspecified type  -     POCT COVID-19 Rapid Screening            Patient Instructions   Please follow up with your Primary care provider within 2-5 days if your signs and symptoms have not resolved or worsen.     If your condition worsens or fails to improve we recommend that you receive another evaluation at the emergency room immediately or contact your primary medical clinic to discuss your concerns.   You must understand that you have received an Urgent Care treatment only and that you may be released before all of your medical problems are known or treated. You, the patient, will arrange for follow up care as instructed.     RED FLAGS/WARNING SYMPTOMS DISCUSSED WITH PATIENT THAT WOULD WARRANT EMERGENT MEDICAL ATTENTION. PATIENT VERBALIZED  UNDERSTANDING.

## 2023-03-04 ENCOUNTER — OFFICE VISIT (OUTPATIENT)
Dept: URGENT CARE | Facility: CLINIC | Age: 38
End: 2023-03-04
Payer: MEDICAID

## 2023-03-04 VITALS
HEART RATE: 89 BPM | HEIGHT: 65 IN | WEIGHT: 210 LBS | OXYGEN SATURATION: 97 % | RESPIRATION RATE: 19 BRPM | TEMPERATURE: 99 F | DIASTOLIC BLOOD PRESSURE: 84 MMHG | SYSTOLIC BLOOD PRESSURE: 132 MMHG | BODY MASS INDEX: 34.99 KG/M2

## 2023-03-04 DIAGNOSIS — J02.9 ACUTE PHARYNGITIS, UNSPECIFIED ETIOLOGY: ICD-10-CM

## 2023-03-04 DIAGNOSIS — J02.9 SORE THROAT: Primary | ICD-10-CM

## 2023-03-04 DIAGNOSIS — B30.9 ACUTE VIRAL CONJUNCTIVITIS OF RIGHT EYE: ICD-10-CM

## 2023-03-04 LAB
CTP QC/QA: YES
CTP QC/QA: YES
MOLECULAR STREP A: NEGATIVE
SARS-COV-2 AG RESP QL IA.RAPID: NEGATIVE

## 2023-03-04 PROCEDURE — 87811 SARS-COV-2 COVID19 W/OPTIC: CPT | Mod: QW,S$GLB,, | Performed by: FAMILY MEDICINE

## 2023-03-04 PROCEDURE — 3008F PR BODY MASS INDEX (BMI) DOCUMENTED: ICD-10-PCS | Mod: CPTII,S$GLB,, | Performed by: FAMILY MEDICINE

## 2023-03-04 PROCEDURE — 3008F BODY MASS INDEX DOCD: CPT | Mod: CPTII,S$GLB,, | Performed by: FAMILY MEDICINE

## 2023-03-04 PROCEDURE — 87651 STREP A DNA AMP PROBE: CPT | Mod: QW,S$GLB,, | Performed by: FAMILY MEDICINE

## 2023-03-04 PROCEDURE — 3075F PR MOST RECENT SYSTOLIC BLOOD PRESS GE 130-139MM HG: ICD-10-PCS | Mod: CPTII,S$GLB,, | Performed by: FAMILY MEDICINE

## 2023-03-04 PROCEDURE — 3079F DIAST BP 80-89 MM HG: CPT | Mod: CPTII,S$GLB,, | Performed by: FAMILY MEDICINE

## 2023-03-04 PROCEDURE — 87651 POCT STREP A MOLECULAR: ICD-10-PCS | Mod: QW,S$GLB,, | Performed by: FAMILY MEDICINE

## 2023-03-04 PROCEDURE — 3075F SYST BP GE 130 - 139MM HG: CPT | Mod: CPTII,S$GLB,, | Performed by: FAMILY MEDICINE

## 2023-03-04 PROCEDURE — 99214 PR OFFICE/OUTPT VISIT, EST, LEVL IV, 30-39 MIN: ICD-10-PCS | Mod: S$GLB,,, | Performed by: FAMILY MEDICINE

## 2023-03-04 PROCEDURE — 1159F PR MEDICATION LIST DOCUMENTED IN MEDICAL RECORD: ICD-10-PCS | Mod: CPTII,S$GLB,, | Performed by: FAMILY MEDICINE

## 2023-03-04 PROCEDURE — 99214 OFFICE O/P EST MOD 30 MIN: CPT | Mod: S$GLB,,, | Performed by: FAMILY MEDICINE

## 2023-03-04 PROCEDURE — 3079F PR MOST RECENT DIASTOLIC BLOOD PRESSURE 80-89 MM HG: ICD-10-PCS | Mod: CPTII,S$GLB,, | Performed by: FAMILY MEDICINE

## 2023-03-04 PROCEDURE — 87811 SARS CORONAVIRUS 2 ANTIGEN POCT, MANUAL READ: ICD-10-PCS | Mod: QW,S$GLB,, | Performed by: FAMILY MEDICINE

## 2023-03-04 PROCEDURE — 1159F MED LIST DOCD IN RCRD: CPT | Mod: CPTII,S$GLB,, | Performed by: FAMILY MEDICINE

## 2023-03-04 RX ORDER — LINACLOTIDE 290 UG/1
290 CAPSULE, GELATIN COATED ORAL
COMMUNITY
Start: 2023-02-23

## 2023-03-04 RX ORDER — CIPROFLOXACIN 250 MG/1
250 TABLET, FILM COATED ORAL EVERY 12 HOURS
COMMUNITY
Start: 2022-11-05 | End: 2024-02-28

## 2023-03-04 RX ORDER — AMOXICILLIN 875 MG/1
875 TABLET, FILM COATED ORAL 2 TIMES DAILY
Qty: 20 TABLET | Refills: 0 | Status: SHIPPED | OUTPATIENT
Start: 2023-03-04 | End: 2023-03-14

## 2023-03-04 RX ORDER — IBUPROFEN 800 MG/1
800 TABLET ORAL
COMMUNITY
Start: 2022-11-05 | End: 2023-03-16 | Stop reason: ALTCHOICE

## 2023-03-04 RX ORDER — LORATADINE 10 MG/1
10 TABLET ORAL DAILY
Qty: 30 TABLET | Refills: 2 | Status: SHIPPED | OUTPATIENT
Start: 2023-03-04 | End: 2024-02-28

## 2023-03-04 RX ORDER — METHOCARBAMOL 750 MG/1
750 TABLET, FILM COATED ORAL EVERY 6 HOURS PRN
COMMUNITY
Start: 2023-02-03 | End: 2023-03-16 | Stop reason: ALTCHOICE

## 2023-03-04 RX ORDER — IBUPROFEN 600 MG/1
600 TABLET ORAL
COMMUNITY
Start: 2023-02-03 | End: 2023-03-16 | Stop reason: ALTCHOICE

## 2023-03-04 RX ORDER — METRONIDAZOLE 500 MG/1
500 TABLET ORAL 2 TIMES DAILY
COMMUNITY
Start: 2023-02-27 | End: 2024-02-28

## 2023-03-04 RX ORDER — GENTAMICIN SULFATE 3 MG/ML
1 SOLUTION/ DROPS OPHTHALMIC EVERY 4 HOURS
Qty: 5 ML | Refills: 0 | Status: SHIPPED | OUTPATIENT
Start: 2023-03-04 | End: 2024-02-28

## 2023-03-04 RX ORDER — DOXYCYCLINE 100 MG/1
100 CAPSULE ORAL EVERY 12 HOURS
COMMUNITY
Start: 2022-11-05 | End: 2024-02-28

## 2023-03-04 NOTE — PROGRESS NOTES
"Subjective:       Patient ID: Cecilia De Los Santos is a 37 y.o. female.    Vitals:  height is 5' 5" (1.651 m) and weight is 95.3 kg (210 lb). Her temperature is 98.8 °F (37.1 °C). Her blood pressure is 132/84 and her pulse is 89. Her respiration is 19 and oxygen saturation is 97%.     Chief Complaint: Eye Problem (Right EYE ) and Sore Throat    Pt present with possible pink eye in her right eye and ear pain, sore throat with a headache. The symptoms began with her eye irritation X 3 days and the Sore and Headache started yesterday.  Works ast Day care  Also having some difficulty swallowing  Right eye with some crusty drainage        Sore Throat   This is a new problem. The current episode started yesterday. The problem has been gradually worsening. There has been no fever. The pain is at a severity of 7/10. The pain is moderate. Associated symptoms include ear pain and headaches. Pertinent negatives include no abdominal pain or congestion. She has tried nothing for the symptoms.     HENT:  Positive for ear pain and sore throat. Negative for congestion.    Gastrointestinal:  Negative for abdominal pain.   Neurological:  Positive for headaches.     Objective:      Physical Exam   Constitutional: She is oriented to person, place, and time. She appears well-developed. She is cooperative.  Non-toxic appearance. She does not appear ill. No distress.   HENT:   Head: Normocephalic and atraumatic.   Ears:   Right Ear: Hearing, tympanic membrane, external ear and ear canal normal.   Left Ear: Hearing, tympanic membrane, external ear and ear canal normal.   Nose: Nose normal. No mucosal edema, rhinorrhea or nasal deformity. No epistaxis. Right sinus exhibits no maxillary sinus tenderness and no frontal sinus tenderness. Left sinus exhibits no maxillary sinus tenderness and no frontal sinus tenderness.   Mouth/Throat: Uvula is midline and mucous membranes are normal. Mucous membranes are moist. No trismus in the jaw. Normal " dentition. No uvula swelling. Posterior oropharyngeal erythema present. Oropharynx is clear.   Eyes: Conjunctivae and lids are normal. Pupils are equal, round, and reactive to light. Right eye exhibits discharge. Left eye exhibits no discharge. No scleral icterus. Extraocular movement intact   Neck: Trachea normal and phonation normal. Neck supple.   Cardiovascular: Normal rate, regular rhythm, normal heart sounds and normal pulses.   Pulmonary/Chest: Effort normal and breath sounds normal. No respiratory distress.   Abdominal: Normal appearance and bowel sounds are normal. She exhibits no distension and no mass. Soft. There is no abdominal tenderness.   Musculoskeletal: Normal range of motion.         General: No deformity. Normal range of motion.      Cervical back: She exhibits tenderness.   Neurological: She is alert and oriented to person, place, and time. She exhibits normal muscle tone. Coordination normal.   Skin: Skin is warm, dry, intact, not diaphoretic and not pale.   Psychiatric: Her speech is normal and behavior is normal. Judgment and thought content normal.   Nursing note and vitals reviewed.      Assessment:       1. Sore throat    2. Acute viral conjunctivitis of right eye    3. Acute pharyngitis, unspecified etiology          Plan:         Sore throat  -     SARS Coronavirus 2 Antigen, POCT Manual Read  -     POCT Strep A, Molecular    Acute viral conjunctivitis of right eye    Acute pharyngitis, unspecified etiology    Other orders  -     loratadine (CLARITIN) 10 mg tablet; Take 1 tablet (10 mg total) by mouth once daily.  Dispense: 30 tablet; Refill: 2  -     amoxicillin (AMOXIL) 875 MG tablet; Take 1 tablet (875 mg total) by mouth 2 (two) times daily. for 10 days  Dispense: 20 tablet; Refill: 0  -     gentamicin (GARAMYCIN) 0.3 % ophthalmic solution; Place 1 drop into the left eye every 4 (four) hours.  Dispense: 5 mL; Refill: 0                    Results for orders placed or performed in visit  on 03/04/23   POCT Strep A, Molecular   Result Value Ref Range    Molecular Strep A, POC Negative Negative     Acceptable Yes      Pt advised to gargle with warm salt water q 4 hours  Tylenol alternate with Ibuprofen 2 po q 6 hours prn  Clraitin one daily

## 2023-03-16 ENCOUNTER — OFFICE VISIT (OUTPATIENT)
Dept: URGENT CARE | Facility: CLINIC | Age: 38
End: 2023-03-16
Payer: MEDICAID

## 2023-03-16 VITALS
TEMPERATURE: 99 F | WEIGHT: 210 LBS | BODY MASS INDEX: 34.99 KG/M2 | HEART RATE: 68 BPM | SYSTOLIC BLOOD PRESSURE: 141 MMHG | DIASTOLIC BLOOD PRESSURE: 100 MMHG | RESPIRATION RATE: 18 BRPM | OXYGEN SATURATION: 96 % | HEIGHT: 65 IN

## 2023-03-16 DIAGNOSIS — R10.31 GROIN PAIN, RIGHT: Primary | ICD-10-CM

## 2023-03-16 DIAGNOSIS — K59.00 CONSTIPATION, UNSPECIFIED CONSTIPATION TYPE: ICD-10-CM

## 2023-03-16 DIAGNOSIS — K52.9 ACUTE GASTROENTERITIS: ICD-10-CM

## 2023-03-16 LAB
B-HCG UR QL: NEGATIVE
BILIRUB UR QL STRIP: POSITIVE
CTP QC/QA: YES
GLUCOSE UR QL STRIP: NEGATIVE
KETONES UR QL STRIP: NEGATIVE
LEUKOCYTE ESTERASE UR QL STRIP: NEGATIVE
PH, POC UA: 5
POC BLOOD, URINE: POSITIVE
POC NITRATES, URINE: NEGATIVE
PROT UR QL STRIP: POSITIVE
SP GR UR STRIP: 1.02 (ref 1–1.03)
UROBILINOGEN UR STRIP-ACNC: ABNORMAL (ref 0.1–1.1)

## 2023-03-16 PROCEDURE — 99213 PR OFFICE/OUTPT VISIT, EST, LEVL III, 20-29 MIN: ICD-10-PCS | Mod: S$GLB,,, | Performed by: NURSE PRACTITIONER

## 2023-03-16 PROCEDURE — 99213 OFFICE O/P EST LOW 20 MIN: CPT | Mod: S$GLB,,, | Performed by: NURSE PRACTITIONER

## 2023-03-16 PROCEDURE — 74019 RADEX ABDOMEN 2 VIEWS: CPT | Mod: FY,S$GLB,, | Performed by: RADIOLOGY

## 2023-03-16 PROCEDURE — 81003 POCT URINALYSIS, DIPSTICK, AUTOMATED, W/O SCOPE: ICD-10-PCS | Mod: QW,S$GLB,, | Performed by: NURSE PRACTITIONER

## 2023-03-16 PROCEDURE — 81025 POCT URINE PREGNANCY: ICD-10-PCS | Mod: S$GLB,,, | Performed by: NURSE PRACTITIONER

## 2023-03-16 PROCEDURE — 81003 URINALYSIS AUTO W/O SCOPE: CPT | Mod: QW,S$GLB,, | Performed by: NURSE PRACTITIONER

## 2023-03-16 PROCEDURE — 81025 URINE PREGNANCY TEST: CPT | Mod: S$GLB,,, | Performed by: NURSE PRACTITIONER

## 2023-03-16 PROCEDURE — 74019 XR ABDOMEN FLAT AND ERECT: ICD-10-PCS | Mod: FY,S$GLB,, | Performed by: RADIOLOGY

## 2023-03-16 RX ORDER — KETOROLAC TROMETHAMINE 30 MG/ML
30 INJECTION, SOLUTION INTRAMUSCULAR; INTRAVENOUS
Status: COMPLETED | OUTPATIENT
Start: 2023-03-16 | End: 2023-03-16

## 2023-03-16 RX ORDER — POLYETHYLENE GLYCOL 3350 17 G/17G
17 POWDER, FOR SOLUTION ORAL DAILY
Qty: 595 G | Refills: 0 | Status: SHIPPED | OUTPATIENT
Start: 2023-03-16 | End: 2024-02-28

## 2023-03-16 RX ORDER — NAPROXEN 500 MG/1
500 TABLET ORAL 2 TIMES DAILY PRN
Qty: 30 TABLET | Refills: 0 | Status: SHIPPED | OUTPATIENT
Start: 2023-03-16 | End: 2024-02-28

## 2023-03-16 RX ORDER — CYCLOBENZAPRINE HCL 10 MG
10 TABLET ORAL 3 TIMES DAILY PRN
Qty: 30 TABLET | Refills: 0 | Status: SHIPPED | OUTPATIENT
Start: 2023-03-16 | End: 2023-03-26

## 2023-03-16 RX ADMIN — KETOROLAC TROMETHAMINE 30 MG: 30 INJECTION, SOLUTION INTRAMUSCULAR; INTRAVENOUS at 03:03

## 2023-03-16 NOTE — PROGRESS NOTES
"Subjective:       Patient ID: Cecilia De Los Santos is a 37 y.o. female.    Vitals:  height is 5' 5" (1.651 m) and weight is 95.3 kg (210 lb). Her oral temperature is 98.8 °F (37.1 °C). Her blood pressure is 141/100 (abnormal) and her pulse is 68. Her respiration is 18 and oxygen saturation is 96%.     Chief Complaint: Emesis    37 yr old female presents to  today with c/o nausea and vomiting; and right sided groin pain.  Symptoms started yesterday. Vomiting has since resolved. 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 No emesis today. She is more concerned of the groin pain today. Denies diarrhea. Endorses constipation. LBM yesterday with very small stools. Denies dysuria. Pt has seen GI for other stomach issues and has been scoped, with no true diagnosis. Unsure of muscle pull or injury. Denies vaginal symptoms and concerns of STIs. Denies feeling swollen lymph nodes or masses.     Emesis   This is a new problem. The current episode started yesterday. The problem has been gradually worsening. There has been no fever. Associated symptoms include myalgias (right groin pain). Pertinent negatives include no arthralgias, chest pain, chills, coughing, decreased urine volume, diarrhea, dizziness, fever, headaches, sweats, URI or weight loss. She has tried nothing for the symptoms.     Constitution: Negative for chills and fever.   Cardiovascular:  Negative for chest pain.   Respiratory:  Negative for cough.    Gastrointestinal:  Positive for vomiting. Negative for diarrhea.   Genitourinary:  Negative for urine decreased.   Musculoskeletal:  Positive for muscle ache (right groin pain). Negative for joint pain.   Neurological:  Negative for dizziness and headaches.     Objective:      Physical Exam   Constitutional: She is oriented to person, place, and time.  Non-toxic appearance. She does not appear ill. No distress.   HENT:   Head: Normocephalic.   Nose: Nose " normal.   Mouth/Throat: Mucous membranes are moist.   Eyes: Right eye exhibits no discharge. Left eye exhibits no discharge.   Neck: Neck supple.   Cardiovascular: Normal rate and regular rhythm.   Pulmonary/Chest: Effort normal and breath sounds normal.   Abdominal: Normal appearance and bowel sounds are normal. She exhibits no distension and no mass. Soft. flat abdomen There is no abdominal tenderness. There is no rebound, no guarding, no left CVA tenderness and no right CVA tenderness. No hernia. Hernia confirmed negative in the umbilical area.      Comments: No enlarged lymph nodes palpated      Musculoskeletal: Normal range of motion.         General: Normal range of motion.   Neurological: She is alert and oriented to person, place, and time.   Skin: Skin is warm, dry and not diaphoretic.   Psychiatric: Her behavior is normal. Mood normal.   Nursing note and vitals reviewed.      X-Ray Abdomen Flat And Erect    Result Date: 3/16/2023  EXAMINATION: XR ABDOMEN FLAT AND ERECT CLINICAL HISTORY: Right lower quadrant pain TECHNIQUE: Flat and erect AP views of the abdomen were performed. COMPARISON: Two thousand fourteen FINDINGS: No free air.  Small amounts of nondistended air scattered throughout GI track, mild amount of fecal debris large bowel distribution lumen.  Large body size degrades exam.     Evidence of constipation.  No GI track obstruction or ileus confirmed. Electronically signed by: Jl Townsend MD Date:    03/16/2023 Time:    15:33    Assessment:       1. Groin pain, right    2. Acute gastroenteritis    3. Constipation, unspecified constipation type          Plan:         Groin pain, right  -     POCT Urinalysis, Dipstick, Automated, W/O Scope  -     POCT urine pregnancy  -     X-Ray Abdomen Flat And Erect; Future; Expected date: 03/16/2023  -     ketorolac injection 30 mg  -     naproxen (NAPROSYN) 500 MG tablet; Take 1 tablet (500 mg total) by mouth 2 (two) times daily as needed (pain).   Dispense: 30 tablet; Refill: 0  -     cyclobenzaprine (FLEXERIL) 10 MG tablet; Take 1 tablet (10 mg total) by mouth 3 (three) times daily as needed for Muscle spasms.  Dispense: 30 tablet; Refill: 0    Acute gastroenteritis  -     POCT Urinalysis, Dipstick, Automated, W/O Scope  -     POCT urine pregnancy  -     X-Ray Abdomen Flat And Erect; Future; Expected date: 03/16/2023    Constipation, unspecified constipation type  -     X-Ray Abdomen Flat And Erect; Future; Expected date: 03/16/2023  -     polyethylene glycol (GLYCOLAX) 17 gram/dose powder; Take 17 g by mouth once daily.  Dispense: 595 g; Refill: 0         Patient Instructions   Heating pads and/or ice packs  Rest   Gentle stretching  OTC glycerine suppository to help pass stool easier.

## 2023-03-16 NOTE — LETTER
March 16, 2023      [facility]  [address] [address]  Phone: [phone]  Fax: [phone]       Patient: Cecilia De Los Santos   YOB: 1985  Date of Visit: 03/16/2023    To Whom It May Concern:    Andrew De Los Santos  was at [facility] on 03/16/2023. The patient may return to work/school on 3/17/23 with no restrictions. If you have any questions or concerns, or if I can be of further assistance, please do not hesitate to contact me.    Sincerely,    Sarah Elizabeth, BENITOP-BC

## 2023-03-16 NOTE — PATIENT INSTRUCTIONS
Heating pads and/or ice packs  Rest   Gentle stretching  OTC glycerine suppository to help pass stool easier.

## 2023-10-19 DIAGNOSIS — Z84.89 FAMILY HISTORY OF GENETIC DISEASE: Primary | ICD-10-CM

## 2023-11-24 ENCOUNTER — LAB VISIT (OUTPATIENT)
Dept: LAB | Facility: HOSPITAL | Age: 38
End: 2023-11-24
Attending: MEDICAL GENETICS
Payer: MEDICAID

## 2023-11-24 DIAGNOSIS — Z84.89 FAMILY HISTORY OF GENETIC DISEASE: ICD-10-CM

## 2023-11-24 PROCEDURE — 36415 COLL VENOUS BLD VENIPUNCTURE: CPT | Performed by: MEDICAL GENETICS

## 2023-11-28 LAB
GENETIC COUNSELING?: YES
GENSO SPECIMEN TYPE: NORMAL
MISCELLANEOUS GENETIC TEST NAME: NORMAL
PARTENTAL OR SIBLING TESTING?: NO
REFERENCE LAB: NORMAL
TEST RESULT: NORMAL

## 2023-11-30 ENCOUNTER — TELEPHONE (OUTPATIENT)
Dept: ADMINISTRATIVE | Facility: HOSPITAL | Age: 38
End: 2023-11-30
Payer: MEDICAID

## 2023-12-01 ENCOUNTER — NUTRITION (OUTPATIENT)
Dept: NUTRITION | Facility: CLINIC | Age: 38
End: 2023-12-01
Payer: MEDICAID

## 2023-12-01 VITALS — WEIGHT: 211.19 LBS | BODY MASS INDEX: 35.18 KG/M2 | HEIGHT: 65 IN

## 2023-12-01 DIAGNOSIS — E66.9 OBESITY (BMI 30-39.9): Primary | ICD-10-CM

## 2023-12-01 DIAGNOSIS — K59.09 OTHER CONSTIPATION: ICD-10-CM

## 2023-12-01 DIAGNOSIS — Z71.3 DIETARY COUNSELING: ICD-10-CM

## 2023-12-01 DIAGNOSIS — I10 HYPERTENSION, UNSPECIFIED TYPE: ICD-10-CM

## 2023-12-01 PROCEDURE — 97802 MEDICAL NUTRITION INDIV IN: CPT | Mod: 59,PBBFAC,PN | Performed by: DIETITIAN, REGISTERED

## 2023-12-01 PROCEDURE — 99999PBSHW PR PBB SHADOW TECHNICAL ONLY FILED TO HB: Mod: PBBFAC,,,

## 2023-12-01 PROCEDURE — 99999 PR PBB SHADOW E&M-EST. PATIENT-LVL III: ICD-10-PCS | Mod: PBBFAC,,,

## 2023-12-01 PROCEDURE — 99999 PR PBB SHADOW E&M-EST. PATIENT-LVL III: CPT | Mod: PBBFAC,,,

## 2023-12-01 PROCEDURE — 99213 OFFICE O/P EST LOW 20 MIN: CPT | Mod: PBBFAC,PN

## 2023-12-01 PROCEDURE — 99999PBSHW PR PBB SHADOW TECHNICAL ONLY FILED TO HB: ICD-10-PCS | Mod: PBBFAC,,,

## 2023-12-01 NOTE — PATIENT INSTRUCTIONS
1500 calorie, low fat, high fiber, low sodium diet.  Avoid fried foods, sugary beverages.  Exercise goal:  30 minutes per day, 3-5 days per week.

## 2023-12-01 NOTE — PROGRESS NOTES
"Referring Physician:Inez Carmona MD     Reason for visit:  Chief Complaint   Patient presents with    Nutrition Counseling    Obesity    Hypertension    Constipation      Initial Visit    :1985     Allergies Reviewed  Meds Reviewed    Anthropometrics  Weight:95.8 kg (211 lb 3.2 oz)  Height:5' 5" (1.651 m)  BMI:Body mass index is 35.15 kg/m².   IBW: 57 kg  +/-10%    Meds:  Outpatient Medications Prior to Visit   Medication Sig Dispense Refill    amLODIPine (NORVASC) 5 MG tablet TK 1 T PO D  0    ciprofloxacin HCl (CIPRO) 250 MG tablet Take 250 mg by mouth every 12 (twelve) hours.      clotrimazole (LOTRIMIN) 1 % cream SUKI EXT AA BID FOR 14 DAYS  1    doxycycline (MONODOX) 100 MG capsule Take 100 mg by mouth every 12 (twelve) hours.      gentamicin (GARAMYCIN) 0.3 % ophthalmic solution Place 1 drop into the left eye every 4 (four) hours. 5 mL 0    hydrocortisone 2.5 % cream APPLY TO THE AFFECTED AREA QD FOR 30 DAYS  1    LINZESS 290 mcg Cap capsule Take 290 mcg by mouth.      lisinopril 10 MG tablet Take 1 tablet (10 mg total) by mouth once daily. 90 tablet 3    loratadine (CLARITIN) 10 mg tablet Take 1 tablet (10 mg total) by mouth once daily. 30 tablet 2    metroNIDAZOLE (FLAGYL) 500 MG tablet Take 500 mg by mouth 2 (two) times daily.      naproxen (NAPROSYN) 500 MG tablet Take 1 tablet (500 mg total) by mouth 2 (two) times daily as needed (pain). 30 tablet 0    PNV cmb#21-iron-folic acid (PRENATAL COMPLETE)  mg-mcg Tab Take 1 tablet by mouth once daily. 30 tablet 2    polyethylene glycol (GLYCOLAX) 17 gram/dose powder Take 17 g by mouth once daily. 595 g 0    triamcinolone acetonide 0.1% (KENALOG) 0.1 % cream Apply topically 2 (two) times daily. for 10 days 80 g 1     No facility-administered medications prior to visit.       Food/Drug Interactions Noted:  n/a    Vitamins/Supplements/Herbs:  see med list    Labs: n/a     Nutrition Prescription: 1710 Kcals/day( 30 kcal/kg IBW),  46-57 g protein( " 0.8-1.0 g/kg IBW)     Support System:   pt does the grocery shopping and cooking for herself and 3 children, ages 14, 8 and 6 yo.  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    Diet Hx:  pt states she has unintentional weight gain, leg pain, occasional constipation, has just been dx with sleep apnea and has high blood pressure.   Pt states her health problems are exacerbated by her weight, and she would like to lose weight. She eats breakfast and lunch at  where she works.  States meals are served family style.  Doesn't bring her lunch from home too often.  Snacks:  candy, cookies, chips.  Beverages:  water; fruit juices; regular soda.    Breakfast: this morning had fresh oranges and water.  Sometimes has juice.  Pancakes were served, but pt declined.  Lunch:  today at work:  red beans and rice, peaches, salad with ranch dressing mixed in, water.  Dinner:  last night with her family:  [facility] fried chicken wrap with mac & cheese and [employer].    Current activity level and/or physical limitations:  no exercise; does interact with children at work.  There's a park behind work she states she can walk in.    Motivation to make changes/anticipated barriers and/or expected adherence:  pt states she's going to have to make significant lifestyle changes to lose weight; states she will review the information provided today again at home    Nutrition-Focus Physical Findings:  pt appears well nourished    Assessment:  pt attentive and asked relevant questions about foods/beverages recommended and to avoid; reading food labels for sodium, calories and fiber; sample meal plan and menus; portion control; healthy snacking; grocery shopping, cooking and eating out tips; role of exercise in weight management; how to increase fiber in diet.  All of her questions were answered, and she verbalized understanding of information.    Nutrition Diagnosis:  Food- and nutrition-related knowledge deficit RT lack of prior exposure to  information AEB dx obesity, constipation, HTN      Recommendations:  1500 calorie, low fat, high fiber, low sodium diet. Avoid fried foods, sugary beverages.Exercise goal:  30 minutes per day, 3-5 days per week.  Handouts provided and reviewed:  High Fiber Nutrition Therapy; My Plate Planner; Cardiac-TLC Nutrition Therapy; 1500 Calorie Sample 5-Day Menus; Servings of Carbohydrates for Meal Planning; Reading A Food Label; Tips to Support Weight Loss; Weight Loss Tips, Heart-Healthy Cooking Tips, Heart Healthy Shopping Tips;  Eat Fit Plan...Anytime/Anywhere; Shop Fit-Get Fit Shopping List; [facility] Snack List for Diabetes; Exercise & Weight-Losing It and Keeping It Off (NLA); Achieving A Healthy Weight (NLA); Setting Goals for Weight Management      Strategies Implemented:  portion control; read food labels; avoid fried foods/sugary beverages    Consultation Time:30 minutes.  Communicated with referring healthcare provider:  Consult note available in pt's Epic chart per MD discretion  Follow Up:  Pt provided with dietitian contact information and advised to contact me with questions or make future appointment if further intervention needed.

## 2024-02-28 ENCOUNTER — OFFICE VISIT (OUTPATIENT)
Dept: URGENT CARE | Facility: CLINIC | Age: 39
End: 2024-02-28
Payer: MEDICAID

## 2024-02-28 VITALS
RESPIRATION RATE: 17 BRPM | SYSTOLIC BLOOD PRESSURE: 144 MMHG | TEMPERATURE: 98 F | BODY MASS INDEX: 35.18 KG/M2 | HEART RATE: 74 BPM | HEIGHT: 65 IN | WEIGHT: 211.19 LBS | DIASTOLIC BLOOD PRESSURE: 90 MMHG | OXYGEN SATURATION: 95 %

## 2024-02-28 DIAGNOSIS — M54.16 LUMBAR RADICULOPATHY: ICD-10-CM

## 2024-02-28 DIAGNOSIS — R31.9 HEMATURIA, UNSPECIFIED TYPE: Primary | ICD-10-CM

## 2024-02-28 DIAGNOSIS — M62.838 MUSCLE SPASM: ICD-10-CM

## 2024-02-28 LAB
BILIRUB UR QL STRIP: NEGATIVE
GLUCOSE UR QL STRIP: NEGATIVE
KETONES UR QL STRIP: NEGATIVE
LEUKOCYTE ESTERASE UR QL STRIP: NEGATIVE
PH, POC UA: 5.5
POC BLOOD, URINE: POSITIVE
POC NITRATES, URINE: NEGATIVE
PROT UR QL STRIP: NEGATIVE
SP GR UR STRIP: 1.02 (ref 1–1.03)
UROBILINOGEN UR STRIP-ACNC: NORMAL (ref 0.1–1.1)

## 2024-02-28 PROCEDURE — 81003 URINALYSIS AUTO W/O SCOPE: CPT | Mod: QW,S$GLB,, | Performed by: PHYSICIAN ASSISTANT

## 2024-02-28 PROCEDURE — 99213 OFFICE O/P EST LOW 20 MIN: CPT | Mod: S$GLB,,, | Performed by: PHYSICIAN ASSISTANT

## 2024-02-28 RX ORDER — MELOXICAM 15 MG/1
15 TABLET ORAL DAILY
Qty: 7 TABLET | Refills: 0 | Status: SHIPPED | OUTPATIENT
Start: 2024-02-28 | End: 2024-03-06

## 2024-02-28 RX ORDER — CYCLOBENZAPRINE HCL 10 MG
10 TABLET ORAL 3 TIMES DAILY PRN
Qty: 30 TABLET | Refills: 0 | Status: SHIPPED | OUTPATIENT
Start: 2024-02-28 | End: 2024-03-09

## 2024-02-28 RX ORDER — KETOROLAC TROMETHAMINE 30 MG/ML
30 INJECTION, SOLUTION INTRAMUSCULAR; INTRAVENOUS
Status: COMPLETED | OUTPATIENT
Start: 2024-02-28 | End: 2024-02-28

## 2024-02-28 RX ADMIN — KETOROLAC TROMETHAMINE 30 MG: 30 INJECTION, SOLUTION INTRAMUSCULAR; INTRAVENOUS at 05:02

## 2024-02-28 NOTE — LETTER
February 28, 2024      [facility] [address]  [place] [address]  Phone: [phone]  Fax: [phone]       Patient: Cecilia De Los Santos   YOB: 1985  Date of Visit: 02/28/2024    To Whom It May Concern:    Andrew De Los Santos  was at [facility] on 02/28/2024. The patient may return to work/school on 03/01/2024 with no restrictions. If you have any questions or concerns, or if I can be of further assistance, please do not hesitate to contact me.    Sincerely,    Sarah Lujan MA

## 2024-02-28 NOTE — PROGRESS NOTES
"Subjective:      Patient ID: Cecilia De Los Santos is a 38 y.o. female.    Vitals:  height is 5' 5" (1.651 m) and weight is 95.8 kg (211 lb 3.2 oz). Her oral temperature is 98.2 °F (36.8 °C). Her blood pressure is 144/90 (abnormal) and her pulse is 74. Her respiration is 17 and oxygen saturation is 95%.     Chief Complaint: Leg Pain    Pt coming into clinic with right upper leg pain that started 2 days ago, treatment includes tylenol, ibuprofen otc with no relief. Patient states pain wraps around her back. Patient had been experiencing the same pain intermittently for months. Monday is became constant and yesterday the pain became severe. Pain states pain wraps around her back on the right side. Patient describes the pain sharp and it tingles down her leg. Patient saw PCP on Monday and she placed a referral to OBGYN and Orthopedics. Denies numbness.     Leg Pain   The incident occurred at home. There was no injury mechanism. The pain is present in the right leg. The quality of the pain is described as aching, burning, cramping, shooting and stabbing. The pain is at a severity of 10/10. The pain is severe. The pain has been Constant since onset. Associated symptoms include tingling. Pertinent negatives include no inability to bear weight, loss of motion, loss of sensation, muscle weakness or numbness. The symptoms are aggravated by movement. She has tried NSAIDs and acetaminophen for the symptoms. The treatment provided no relief.     Constitution: Negative for chills and fever.   Respiratory:  Negative for shortness of breath.    Musculoskeletal:  Positive for pain and muscle ache. Negative for trauma, joint pain, joint swelling, abnormal ROM of joint, back pain and muscle cramps.   Skin:  Negative for rash and bruising.   Neurological:  Positive for tingling. Negative for numbness and tremors.      Past Medical History:   Diagnosis Date    Gestational diabetes     Hypertension     PCOS (polycystic ovarian syndrome)  "       Past Surgical History:   Procedure Laterality Date    TUBAL LIGATION         Family History   Problem Relation Age of Onset    No Known Problems Mother     No Known Problems Father     No Known Problems Sister     No Known Problems Brother        Social History     Socioeconomic History    Marital status: Single   Tobacco Use    Smoking status: Never     Passive exposure: Never    Smokeless tobacco: Never   Substance and Sexual Activity    Alcohol use: No     Comment: occasionally    Drug use: No    Sexual activity: Yes     Partners: Male     Birth control/protection: None     Social Determinants of Health     Financial Resource Strain: Medium Risk (11/30/2023)    Overall Financial Resource Strain (CARDIA)     Difficulty of Paying Living Expenses: Somewhat hard   Food Insecurity: Food Insecurity Present (11/30/2023)    Hunger Vital Sign     Worried About Running Out of Food in the Last Year: Sometimes true     Ran Out of Food in the Last Year: Sometimes true   Transportation Needs: No Transportation Needs (11/30/2023)    PRAPARE - Transportation     Lack of Transportation (Medical): No     Lack of Transportation (Non-Medical): No   Physical Activity: Unknown (11/30/2023)    Exercise Vital Sign     Days of Exercise per Week: 0 days   Stress: Stress Concern Present (11/30/2023)    [employer] - Occupational Stress Questionnaire     Feeling of Stress : Very much   Social Connections: Unknown (11/30/2023)    Social Connection and Isolation Panel [NHANES]     Frequency of Communication with Friends and Family: More than three times a week     Frequency of Social Gatherings with Friends and Family: Once a week     Active Member of Clubs or Organizations: Yes     Attends Club or Organization Meetings: More than 4 times per year     Marital Status: Never    Housing Stability: Unknown (11/30/2023)    Housing Stability Vital Sign     Unable to Pay for Housing in the Last Year: No      Unstable Housing in the Last Year: No       Current Outpatient Medications   Medication Sig Dispense Refill    amLODIPine (NORVASC) 5 MG tablet TK 1 T PO D  0    LINZESS 290 mcg Cap capsule Take 290 mcg by mouth.      cyclobenzaprine (FLEXERIL) 10 MG tablet Take 1 tablet (10 mg total) by mouth 3 (three) times daily as needed for Muscle spasms. 30 tablet 0    meloxicam (MOBIC) 15 MG tablet Take 1 tablet (15 mg total) by mouth once daily. for 7 days 7 tablet 0     No current facility-administered medications for this visit.       Review of patient's allergies indicates:  No Known Allergies      Physical Exam   Constitutional: She is oriented to person, place, and time. She appears well-developed. She is cooperative.  Non-toxic appearance. She does not appear ill. No distress.   HENT:   Head: Normocephalic and atraumatic.   Mouth/Throat: Oropharynx is clear and moist and mucous membranes are normal.   Eyes: Lids are normal.   Neck: Trachea normal and phonation normal. Neck supple.   Cardiovascular: Normal rate, regular rhythm and normal pulses.   Pulmonary/Chest: Effort normal. No respiratory distress.   Abdominal: Normal appearance and bowel sounds are normal. She exhibits no distension and no mass. Soft. flat abdomen There is no abdominal tenderness. There is no rebound, no guarding, no left CVA tenderness and no right CVA tenderness. No hernia.   Musculoskeletal: Normal range of motion.         General: Tenderness present. No swelling, deformity or signs of injury. Normal range of motion.      Cervical back: She exhibits no tenderness.        Arms:         Legs:    Neurological: She is alert and oriented to person, place, and time. She has normal strength and normal reflexes. No sensory deficit.   Skin: Skin is warm, dry, intact, not diaphoretic and no rash. No bruising   Psychiatric: Her speech is normal and behavior is normal. Mood, judgment and thought content normal.   Nursing note and vitals  reviewed.    Results for orders placed or performed in visit on 02/28/24   POCT Urinalysis, Dipstick, Automated, W/O Scope   Result Value Ref Range    POC Blood, Urine Positive (A) Negative    POC Bilirubin, Urine Negative Negative    POC Urobilinogen, Urine Normal 0.1 - 1.1    POC Ketones, Urine Negative Negative    POC Protein, Urine Negative Negative    POC Nitrates, Urine Negative Negative    POC Glucose, Urine Negative Negative    pH, UA 5.5     POC Specific Gravity, Urine 1.025 1.003 - 1.029    POC Leukocytes, Urine Negative Negative         Assessment:     1. Hematuria, unspecified type    2. Lumbar radiculopathy    3. Muscle spasm        Plan:       Hematuria, unspecified type  -     POCT Urinalysis, Dipstick, Automated, W/O Scope  -     Ambulatory referral/consult to Urology    Lumbar radiculopathy  -     ketorolac injection 30 mg  -     meloxicam (MOBIC) 15 MG tablet; Take 1 tablet (15 mg total) by mouth once daily. for 7 days  Dispense: 7 tablet; Refill: 0  -     cyclobenzaprine (FLEXERIL) 10 MG tablet; Take 1 tablet (10 mg total) by mouth 3 (three) times daily as needed for Muscle spasms.  Dispense: 30 tablet; Refill: 0    Muscle spasm  -     cyclobenzaprine (FLEXERIL) 10 MG tablet; Take 1 tablet (10 mg total) by mouth 3 (three) times daily as needed for Muscle spasms.  Dispense: 30 tablet; Refill: 0      Results reviewed  I have reviewed the patient chart and pertinent past imaging/labs.  Patient Instructions   You were given a toradol injection today do not take ibuprofen but tylenol is safe. Do not start mobic til tomorrow take take with food and as prescribed. Ice for 15 min at a time 3-4 times a day. Use muscle relaxer as needed this can be sedating do not drink alcohol or drive with this medication. A referral was placed for you someone should contact you however if you do not hear from them in a week please call [phone] to schedule appointment. Follow up with pcp

## 2024-02-28 NOTE — PATIENT INSTRUCTIONS
You were given a toradol injection today do not take ibuprofen but tylenol is safe. Do not start mobic til tomorrow take take with food and as prescribed. Ice for 15 min at a time 3-4 times a day. Use muscle relaxer as needed this can be sedating do not drink alcohol or drive with this medication. A referral was placed for you someone should contact you however if you do not hear from them in a week please call [phone] to schedule appointment. Follow up with pcp

## 2024-06-03 ENCOUNTER — OFFICE VISIT (OUTPATIENT)
Dept: URGENT CARE | Facility: CLINIC | Age: 39
End: 2024-06-03
Payer: MEDICAID

## 2024-06-03 VITALS
RESPIRATION RATE: 16 BRPM | HEIGHT: 65 IN | DIASTOLIC BLOOD PRESSURE: 76 MMHG | HEART RATE: 70 BPM | BODY MASS INDEX: 35.16 KG/M2 | OXYGEN SATURATION: 98 % | WEIGHT: 211 LBS | TEMPERATURE: 97 F | SYSTOLIC BLOOD PRESSURE: 128 MMHG

## 2024-06-03 DIAGNOSIS — M54.41 CHRONIC BILATERAL LOW BACK PAIN WITH RIGHT-SIDED SCIATICA: ICD-10-CM

## 2024-06-03 DIAGNOSIS — N89.8 VAGINAL ODOR: ICD-10-CM

## 2024-06-03 DIAGNOSIS — R31.21 ASYMPTOMATIC MICROSCOPIC HEMATURIA: ICD-10-CM

## 2024-06-03 DIAGNOSIS — G89.29 CHRONIC BILATERAL LOW BACK PAIN WITH RIGHT-SIDED SCIATICA: ICD-10-CM

## 2024-06-03 DIAGNOSIS — R30.0 DYSURIA: ICD-10-CM

## 2024-06-03 DIAGNOSIS — B37.31 CANDIDAL VAGINITIS: Primary | ICD-10-CM

## 2024-06-03 DIAGNOSIS — N89.8 VAGINAL DISCHARGE: ICD-10-CM

## 2024-06-03 LAB
B-HCG UR QL: NEGATIVE
BILIRUB UR QL STRIP: NEGATIVE
CTP QC/QA: YES
GLUCOSE UR QL STRIP: NEGATIVE
KETONES UR QL STRIP: NEGATIVE
LEUKOCYTE ESTERASE UR QL STRIP: NEGATIVE
PH, POC UA: 5.5 (ref 5–8)
POC BLOOD, URINE: POSITIVE
POC NITRATES, URINE: NEGATIVE
PROT UR QL STRIP: POSITIVE
SP GR UR STRIP: 1.03 (ref 1–1.03)
UROBILINOGEN UR STRIP-ACNC: 0.2 (ref 0.1–1.1)

## 2024-06-03 PROCEDURE — 81514 NFCT DS BV&VAGINITIS DNA ALG: CPT | Performed by: PHYSICIAN ASSISTANT

## 2024-06-03 PROCEDURE — 81003 URINALYSIS AUTO W/O SCOPE: CPT | Mod: QW,S$GLB,, | Performed by: PHYSICIAN ASSISTANT

## 2024-06-03 PROCEDURE — 99214 OFFICE O/P EST MOD 30 MIN: CPT | Mod: S$GLB,,, | Performed by: PHYSICIAN ASSISTANT

## 2024-06-03 PROCEDURE — 81025 URINE PREGNANCY TEST: CPT | Mod: S$GLB,,, | Performed by: PHYSICIAN ASSISTANT

## 2024-06-03 RX ORDER — IBUPROFEN 800 MG/1
800 TABLET ORAL EVERY 6 HOURS PRN
Qty: 30 TABLET | Refills: 0 | Status: CANCELLED | OUTPATIENT
Start: 2024-06-04 | End: 2024-06-14

## 2024-06-03 RX ORDER — KETOROLAC TROMETHAMINE 30 MG/ML
30 INJECTION, SOLUTION INTRAMUSCULAR; INTRAVENOUS
Status: COMPLETED | OUTPATIENT
Start: 2024-06-03 | End: 2024-06-03

## 2024-06-03 RX ORDER — FLUCONAZOLE 150 MG/1
TABLET ORAL
Qty: 2 TABLET | Refills: 0 | Status: SHIPPED | OUTPATIENT
Start: 2024-06-03

## 2024-06-03 RX ORDER — METHYLPREDNISOLONE 4 MG/1
TABLET ORAL
Qty: 21 EACH | Refills: 0 | Status: SHIPPED | OUTPATIENT
Start: 2024-06-03 | End: 2024-06-24

## 2024-06-03 RX ORDER — MELOXICAM 7.5 MG/1
7.5 TABLET ORAL
COMMUNITY
Start: 2024-03-25 | End: 2024-06-03

## 2024-06-03 RX ADMIN — KETOROLAC TROMETHAMINE 30 MG: 30 INJECTION, SOLUTION INTRAMUSCULAR; INTRAVENOUS at 04:06

## 2024-06-03 NOTE — PROGRESS NOTES
"Subjective:      Patient ID: Cecilia De Los Santos is a 38 y.o. female.    Vitals:  height is 5' 5" (1.651 m) and weight is 95.7 kg (211 lb). Her oral temperature is 97 °F (36.1 °C). Her blood pressure is 128/76 and her pulse is 70. Her respiration is 16 and oxygen saturation is 98%.     Chief Complaint: Leg Pain    Cecilia De Los Santos is a 38 y.o. female who complains of  right sided leg pain, sx started 5 months ago, denies any injury, her dr was going to do an mri and it was denied due to her medicaid.  Pain is a 10/10.Ibuprofen 200 mg makes her throw up. She reports one of the medications from her last visit; made her throw up. She has tried heating pads and ice. She is waiting to get approved for physical therapy before she can get a MRI.   She reports vaginal odor such as mildew.    Patient states that she does not see blood in urine but has history of blood on her urine tests. She reports dysuria.     Leg Pain   The incident occurred more than 1 week ago. There was no injury mechanism. The pain is present in the right leg. The quality of the pain is described as cramping. The pain is at a severity of 5/10. The pain has been Worsening since onset. She reports no foreign bodies present. The symptoms are aggravated by movement. She has tried heat, ice and NSAIDs for the symptoms.       Constitution: Negative for chills, fatigue, fever and generalized weakness.   HENT:  Negative for congestion, postnasal drip, sinus pain, sinus pressure, sore throat, trouble swallowing and voice change.    Neck: Negative for neck pain and neck stiffness.   Cardiovascular:  Negative for chest pain, leg swelling, palpitations and sob on exertion.   Respiratory:  Negative for cough, sputum production, shortness of breath, wheezing and asthma.    Gastrointestinal:  Negative for abdominal pain, nausea and vomiting.   Genitourinary:  Positive for dysuria, frequency, vaginal discharge, vaginal odor and pelvic pain. Negative for urgency, urine " decreased, flank pain, hematuria, history of kidney stones, missed menses, vaginal pain, vaginal bleeding, painful intercourse and genital sore.   Musculoskeletal:  Positive for pain, back pain and muscle ache. Negative for trauma, joint pain, joint swelling, abnormal ROM of joint, arthritis, gout, muscle cramps and history of spine disorder.   Skin:  Negative for rash.   Allergic/Immunologic: Negative for asthma.   Psychiatric/Behavioral:  Negative for nervous/anxious. The patient is not nervous/anxious.       Objective:     Physical Exam   Constitutional: She is oriented to person, place, and time. No distress.      Comments:Patient is awake and alert, sitting up in exam chair, speaking and answering in complete sentences     normal  HENT:   Head: Normocephalic and atraumatic.   Ears:   Right Ear: Tympanic membrane, external ear and ear canal normal.   Left Ear: Tympanic membrane, external ear and ear canal normal.   Nose: No rhinorrhea or congestion.   Mouth/Throat: Mucous membranes are moist. No oropharyngeal exudate or posterior oropharyngeal erythema. Oropharynx is clear.   Eyes: Conjunctivae are normal. Pupils are equal, round, and reactive to light. Extraocular movement intact   Neck: Neck supple.   Cardiovascular: Normal rate, regular rhythm, normal heart sounds and normal pulses.   Pulmonary/Chest: Effort normal and breath sounds normal. No respiratory distress. She has no wheezes. She has no rhonchi. She has no rales.   Abdominal: Normal appearance. She exhibits no distension and no mass. There is no abdominal tenderness. There is no rebound, no guarding, no left CVA tenderness and no right CVA tenderness. No hernia.   Musculoskeletal: Normal range of motion.         General: Tenderness present. Normal range of motion.      Cervical back: She exhibits no tenderness.      Comments: Patient appears to be in mild to moderate pain, antalgic gait noted. Lumbosacral spine area reveals no local tenderness or  mass. Painful and reduced LS ROM noted. Straight leg raise is positive at 45 degrees on right leg.    Lymphadenopathy:     She has no cervical adenopathy.   Neurological: She is alert and oriented to person, place, and time.   Skin: Skin is warm. Capillary refill takes less than 2 seconds.   Psychiatric: Her behavior is normal. Mood, judgment and thought content normal.   Nursing note and vitals reviewed.      Assessment:     1. Candidal vaginitis    2. Chronic bilateral low back pain with right-sided sciatica    3. Dysuria    4. Vaginal discharge    5. Vaginal odor    6. Asymptomatic microscopic hematuria      Patient presents with clinical exam findings and history consistent with above.      On exam, patient is nontoxic appearing and vitals are stable.      Diagnostic testing results were reviewed and discussed with patient/guardian.   Tests ordered in clinic:  Results for orders placed or performed in visit on 06/03/24   POCT urine pregnancy   Result Value Ref Range    POC Preg Test, Ur Negative Negative     Acceptable Yes    POCT Urinalysis, Dipstick, Automated, W/O Scope   Result Value Ref Range    POC Blood, Urine Positive (A) Negative    POC Bilirubin, Urine Negative Negative    POC Urobilinogen, Urine 0.2 0.1 - 1.1    POC Ketones, Urine Negative Negative    POC Protein, Urine Positive (A) Negative    POC Nitrates, Urine Negative Negative    POC Glucose, Urine Negative Negative    pH, UA 5.5 5 - 8    POC Specific Gravity, Urine 1.030 (A) 1.003 - 1.029    POC Leukocytes, Urine Negative Negative     Previous progress notes/admissions/labs and medications were reviewed.      Plan:       Candidal vaginitis  -     fluconazole (DIFLUCAN) 150 MG Tab; Take 1 tablet on day 1 then repeat dose in 72 hours if still symptomatic.  Dispense: 2 tablet; Refill: 0  -     Ambulatory referral/consult to Obstetrics / Gynecology    Chronic bilateral low back pain with right-sided sciatica  -     ketorolac injection  30 mg  -     methylPREDNISolone (MEDROL DOSEPACK) 4 mg tablet; use as directed  Dispense: 21 each; Refill: 0  -     Ambulatory referral/consult to Orthopedics    Dysuria  -     POCT urine pregnancy  -     POCT Urinalysis, Dipstick, Automated, W/O Scope  -     fluconazole (DIFLUCAN) 150 MG Tab; Take 1 tablet on day 1 then repeat dose in 72 hours if still symptomatic.  Dispense: 2 tablet; Refill: 0  -     methylPREDNISolone (MEDROL DOSEPACK) 4 mg tablet; use as directed  Dispense: 21 each; Refill: 0  -     Ambulatory referral/consult to Obstetrics / Gynecology    Vaginal discharge  -     POCT urine pregnancy  -     POCT Urinalysis, Dipstick, Automated, W/O Scope  -     Vaginosis Screen by DNA Probe  -     fluconazole (DIFLUCAN) 150 MG Tab; Take 1 tablet on day 1 then repeat dose in 72 hours if still symptomatic.  Dispense: 2 tablet; Refill: 0    Vaginal odor  -     POCT urine pregnancy  -     POCT Urinalysis, Dipstick, Automated, W/O Scope  -     fluconazole (DIFLUCAN) 150 MG Tab; Take 1 tablet on day 1 then repeat dose in 72 hours if still symptomatic.  Dispense: 2 tablet; Refill: 0    Asymptomatic microscopic hematuria  -     Ambulatory referral/consult to Internal Medicine                    1) See orders for this visit as documented in the electronic medical record.  2) Symptomatic therapy suggested: use acetaminophen/ibuprofen every 6-8 hours prn pain or fever, push fluids.   3) Call or return to clinic prn if these symptoms worsen or fail to improve as anticipated.    Discussed results/diagnosis/plan with patient in clinic.  We had shared decision making for patient's treatment. Patient verbalized understanding and in agreement with current treatment plan.     Patient was instructed to return for re-evaluation with urgent care or PCP for continued outpatient workup and management if symptoms do not improve/worsening symptoms. Strict ED versus clinic precautions given in depth.    Discharge and follow-up  "instructions given verbally/printed with the patient who expressed understanding. The instructions and results are also [facility] on [facility].              Margarita "Sage West PA-C          Patient Instructions   You will get a phone call in 3-5 days from a provider regarding your Vaginosis screening testing results.    Fluconazole 150 mg orally given once or twice.   Side effects and drug interactions - Side effects of single-dose fluconazole (150 mg) tend to be mild and infrequent. Oral fluconazole may cause gastrointestinal intolerance, headache, rash, and transient liver function abnormalities.     Vaginal Creams  Wash hands before using. Insert full applicator into vagina gently and expel cream into vagina. Wash applicator with soap and water following use. Remain lying down for 30 minutes following administration.     Bacterial vaginosis (BV) is the most common cause of abnormal "fishy smelling" white thin vaginal discharge. Burning or itching may be present.   Treatment options include:  Metronidazole  (Flagyl) 500 mg BID for 7 days or Metronidazole vaginal gel at night for 5 days   People using oral or vaginal metronidazole may experience a metallic taste, nausea, and a temporarily lowered blood count. Metronidazole pills also interact with the anticoagulant medication warfarin and can potentially increase the risk of bleeding. The vaginal gel causes fewer gastrointestinal side effects.   Avoid alcohol use during course of antibiotic therapy and 2 days after for a total of 9 days.      ----------------------------------------------------------------------  Discussed with patient that if he/she is in pain today, only take tylenol due to toradol injection received in clinic. You can continue NSAIDS tomorrow such as ibuprofen (Motrin/Advil), naproxen (Aleve), Mobic (Meloxicam).     A strain is an injury to muscles or tendons. Immediate treatment of sprains or strains includes RICE - Rest, ice, compression and " elevation to the affected joint or limb as needed.    Put a heating pad on your back for 20 minutes at a time a few times each day. Never go to sleep with heat or ice on your back.  Stay as active as you can without causing too much pain. It is OK to rest your back for a day or so. Be sure to get up and move around gently during the day as you are able. After a few days, slowly start to increase your activity level as you are able to. If something causes your pain to come back or get worse, stop and go back to doing easier activities that did not hurt.  Protect your back. Limit sports, twisting, and heavy lifting until you are fully recovered.  Do not sit or  one position for a long time. You may want to sleep with a pillow under or between your knees if this eases your pain.  You may want to take medicine like ibuprofen or naproxen for swelling and pain. These are nonsteroidal anti-inflammatory drugs (NSAIDs).       If you were prescribed a narcotic or muscle relaxer (Flexeril or Robaxin), do not drive or operate heavy equipment or machinery while taking these medications. These medications can make you drowsy. If you are driving, it is recommended to take ibuprofen TID prn pain and take flexeril/robaxin at night.    If you were not prescribed an anti-inflammatory medication, and if you do not have any history of stomach/intestinal ulcers, or kidney disease, or are not taking a blood thinner such as Coumadin, Plavix, Pradaxa, Eloquis, or Xaralta for example, it is OK to take over the counter Ibuprofen or Advil or Motrin or Aleve as directed.  Do not take these medications on an empty stomach.    Please drink plenty of fluids.  Please get plenty of rest.        Please remember that you have received care at an urgent care today. Urgent cares are not emergency rooms and are not equipped to handle life threatening emergencies and cannot rule in or out certain medical conditions and you may be released before  all of your medical problems are known or treated. Please arrange follow up with your primary care physician or speciality clinic (orthopedics) within 2-5 days if your signs and symptoms have not resolved or worsen.     Patient can call our Referral Hotline at [phone] to make an appointment.    Please return here or go to the Emergency Department for any concerns or worsening of condition.Patient was educated on signs/symptoms that would warrant emergent medical attention.   You have sudden shortness of breath or a sudden chest pain.  You have very bad belly pain, especially if it is worse when you try to get up or walk.  You start to have very bad pain in your chest, back, or head.  You feel like you might pass out when you try to sit up or stand.  You are very unsteady when you try to walk.  You are throwing up a lot.  You become confused or very sleepy or cannot wake up.  You have a wound that opens up and you can see muscle or other tissue below the skin.  You have a wound that is draining thick yellow, green, or bad-smelling discharge.  You have weakness or numbness in your arms or legs.  You have blood in your urine or bowel movements.  You have a fever of 100.4°F (38°C) or higher.  You have pain that does not get better with pain medicine.  You have a wound that is not healing.  You have a headache or stiff neck that does not get better in 2 to 3 days.

## 2024-06-03 NOTE — PATIENT INSTRUCTIONS
"You will get a phone call in 3-5 days from a provider regarding your Vaginosis screening testing results.    Fluconazole 150 mg orally given once or twice.   Side effects and drug interactions - Side effects of single-dose fluconazole (150 mg) tend to be mild and infrequent. Oral fluconazole may cause gastrointestinal intolerance, headache, rash, and transient liver function abnormalities.     Vaginal Creams  Wash hands before using. Insert full applicator into vagina gently and expel cream into vagina. Wash applicator with soap and water following use. Remain lying down for 30 minutes following administration.     Bacterial vaginosis (BV) is the most common cause of abnormal "fishy smelling" white thin vaginal discharge. Burning or itching may be present.   Treatment options include:  Metronidazole  (Flagyl) 500 mg BID for 7 days or Metronidazole vaginal gel at night for 5 days   People using oral or vaginal metronidazole may experience a metallic taste, nausea, and a temporarily lowered blood count. Metronidazole pills also interact with the anticoagulant medication warfarin and can potentially increase the risk of bleeding. The vaginal gel causes fewer gastrointestinal side effects.   Avoid alcohol use during course of antibiotic therapy and 2 days after for a total of 9 days.      ----------------------------------------------------------------------  Discussed with patient that if he/she is in pain today, only take tylenol due to toradol injection received in clinic. You can continue NSAIDS tomorrow such as ibuprofen (Motrin/Advil), naproxen (Aleve), Mobic (Meloxicam).     A strain is an injury to muscles or tendons. Immediate treatment of sprains or strains includes RICE - Rest, ice, compression and elevation to the affected joint or limb as needed.    Put a heating pad on your back for 20 minutes at a time a few times each day. Never go to sleep with heat or ice on your back.  Stay as active as you can " without causing too much pain. It is OK to rest your back for a day or so. Be sure to get up and move around gently during the day as you are able. After a few days, slowly start to increase your activity level as you are able to. If something causes your pain to come back or get worse, stop and go back to doing easier activities that did not hurt.  Protect your back. Limit sports, twisting, and heavy lifting until you are fully recovered.  Do not sit or  one position for a long time. You may want to sleep with a pillow under or between your knees if this eases your pain.  You may want to take medicine like ibuprofen or naproxen for swelling and pain. These are nonsteroidal anti-inflammatory drugs (NSAIDs).       If you were prescribed a narcotic or muscle relaxer (Flexeril or Robaxin), do not drive or operate heavy equipment or machinery while taking these medications. These medications can make you drowsy. If you are driving, it is recommended to take ibuprofen TID prn pain and take flexeril/robaxin at night.    If you were not prescribed an anti-inflammatory medication, and if you do not have any history of stomach/intestinal ulcers, or kidney disease, or are not taking a blood thinner such as Coumadin, Plavix, Pradaxa, Eloquis, or Xaralta for example, it is OK to take over the counter Ibuprofen or Advil or Motrin or Aleve as directed.  Do not take these medications on an empty stomach.    Please drink plenty of fluids.  Please get plenty of rest.        Please remember that you have received care at an urgent care today. Urgent cares are not emergency rooms and are not equipped to handle life threatening emergencies and cannot rule in or out certain medical conditions and you may be released before all of your medical problems are known or treated. Please arrange follow up with your primary care physician or speciality clinic (orthopedics) within 2-5 days if your signs and symptoms have not resolved or  worsen.     Patient can call our Referral Hotline at [phone] to make an appointment.    Please return here or go to the Emergency Department for any concerns or worsening of condition.Patient was educated on signs/symptoms that would warrant emergent medical attention.   You have sudden shortness of breath or a sudden chest pain.  You have very bad belly pain, especially if it is worse when you try to get up or walk.  You start to have very bad pain in your chest, back, or head.  You feel like you might pass out when you try to sit up or stand.  You are very unsteady when you try to walk.  You are throwing up a lot.  You become confused or very sleepy or cannot wake up.  You have a wound that opens up and you can see muscle or other tissue below the skin.  You have a wound that is draining thick yellow, green, or bad-smelling discharge.  You have weakness or numbness in your arms or legs.  You have blood in your urine or bowel movements.  You have a fever of 100.4°F (38°C) or higher.  You have pain that does not get better with pain medicine.  You have a wound that is not healing.  You have a headache or stiff neck that does not get better in 2 to 3 days.

## 2024-06-03 NOTE — LETTER
"  Taylor 3, 2024      [facility] [address]  [place] [address]  Phone: [phone]  Fax: [phone]       Patient: Cecilia De Los Santos   YOB: 1985  Date of Visit: 06/03/2024    To Whom It May Concern:    Andrew De Los Santos  was at [facility] on 06/03/2024. The patient may return to work/school on 6/4/24 with no restrictions. If you have any questions or concerns, or if I can be of further assistance, please do not hesitate to contact me.    Sincerely,        Margaritajan West PA-C (Jackie)       "

## 2024-06-04 LAB
BACTERIAL VAGINOSIS DNA: POSITIVE
CANDIDA GLABRATA DNA: NEGATIVE
CANDIDA KRUSEI DNA: NEGATIVE
CANDIDA RRNA VAG QL PROBE: NEGATIVE
T VAGINALIS RRNA GENITAL QL PROBE: NEGATIVE

## 2024-06-05 ENCOUNTER — TELEPHONE (OUTPATIENT)
Dept: URGENT CARE | Facility: CLINIC | Age: 39
End: 2024-06-05
Payer: MEDICAID

## 2024-06-05 DIAGNOSIS — B96.89 BV (BACTERIAL VAGINOSIS): Primary | ICD-10-CM

## 2024-06-05 DIAGNOSIS — N76.0 BV (BACTERIAL VAGINOSIS): Primary | ICD-10-CM

## 2024-06-05 RX ORDER — METRONIDAZOLE 500 MG/1
500 TABLET ORAL EVERY 12 HOURS
Qty: 14 TABLET | Refills: 0 | Status: SHIPPED | OUTPATIENT
Start: 2024-06-05 | End: 2024-06-12

## 2024-06-05 NOTE — TELEPHONE ENCOUNTER
Results reviewed patient BV positive sent over Flagyl advised on how to take and possible side effects do not drink alcohol with the patient we will follow up as needed

## 2025-01-26 ENCOUNTER — OFFICE VISIT (OUTPATIENT)
Dept: URGENT CARE | Facility: CLINIC | Age: 40
End: 2025-01-26
Payer: COMMERCIAL

## 2025-01-26 PROCEDURE — 99499 UNLISTED E&M SERVICE: CPT | Mod: S$GLB,,, | Performed by: FAMILY MEDICINE

## 2025-03-25 ENCOUNTER — OFFICE VISIT (OUTPATIENT)
Dept: URGENT CARE | Facility: CLINIC | Age: 40
End: 2025-03-25
Payer: COMMERCIAL

## 2025-03-25 VITALS
SYSTOLIC BLOOD PRESSURE: 183 MMHG | RESPIRATION RATE: 18 BRPM | BODY MASS INDEX: 35.16 KG/M2 | OXYGEN SATURATION: 99 % | HEART RATE: 94 BPM | WEIGHT: 211 LBS | DIASTOLIC BLOOD PRESSURE: 83 MMHG | TEMPERATURE: 99 F | HEIGHT: 65 IN

## 2025-03-25 DIAGNOSIS — R22.0 LIP SWELLING: ICD-10-CM

## 2025-03-25 DIAGNOSIS — I10 ELEVATED BLOOD PRESSURE READING IN OFFICE WITH DIAGNOSIS OF HYPERTENSION: ICD-10-CM

## 2025-03-25 DIAGNOSIS — L50.9 HIVES: Primary | ICD-10-CM

## 2025-03-25 DIAGNOSIS — T78.40XA ALLERGIC REACTION, INITIAL ENCOUNTER: ICD-10-CM

## 2025-03-25 PROCEDURE — 99214 OFFICE O/P EST MOD 30 MIN: CPT | Mod: 25,S$GLB,, | Performed by: PHYSICIAN ASSISTANT

## 2025-03-25 PROCEDURE — 96372 THER/PROPH/DIAG INJ SC/IM: CPT | Mod: S$GLB,,, | Performed by: PHYSICIAN ASSISTANT

## 2025-03-25 RX ORDER — AMLODIPINE BESYLATE 10 MG/1
1 TABLET ORAL DAILY
COMMUNITY
Start: 2025-03-20 | End: 2026-03-20

## 2025-03-25 RX ORDER — DEXAMETHASONE SODIUM PHOSPHATE 10 MG/ML
10 INJECTION INTRAMUSCULAR; INTRAVENOUS
Status: COMPLETED | OUTPATIENT
Start: 2025-03-25 | End: 2025-03-25

## 2025-03-25 RX ORDER — FAMOTIDINE 20 MG/1
20 TABLET, FILM COATED ORAL
Status: COMPLETED | OUTPATIENT
Start: 2025-03-25 | End: 2025-03-25

## 2025-03-25 RX ADMIN — FAMOTIDINE 20 MG: 20 TABLET, FILM COATED ORAL at 06:03

## 2025-03-25 RX ADMIN — DEXAMETHASONE SODIUM PHOSPHATE 10 MG: 10 INJECTION INTRAMUSCULAR; INTRAVENOUS at 06:03

## 2025-03-25 NOTE — PATIENT INSTRUCTIONS
- Rest.    - Drink plenty of fluids.    - Acetaminophen (tylenol) or Ibuprofen (advil,motrin) as directed as needed for fever/pain. Avoid tylenol if you have a history of liver disease. Do not take ibuprofen if you have a history of GI bleeding, kidney disease, or if you take blood thinners.     - Take over-the-counter claritin, zyrtec, allegra, or xyzal as directed for itching/rash/allergic reaction.  You can take zyrtec once daily at least, but for hives you can take it up to 4 times daily.   - you can take Benadryl over-the-counter as directed as well for itching/rash/allergic reaction.this may cause drowsiness.  - you can also take over-the-counter Pepcid/famotidine (heartburn medication) for itching/rash/allergic reaction. You received 20 mg in clinic today. You can take 20-40 mg daily  The above are all over the counter medications     - You received a steroid shot today.  This can elevate your blood pressure, elevate your blood sugar, water weight gain, nervous energy, redness to the face and dimpling of the skin where the shot goes in.   - Do not use steroids more than 3 times per year.   - If you have diabetes, please check you blood sugar frequently.  - If you have high blood pressure, please check your blood pressure frequently.     - Follow up with your PCP or specialty clinic as directed in the next 1-2 weeks if not improved or as needed.  You can call [phone] to schedule an appointment with the appropriate provider.    - Go to the ER or seek medical attention immediately if you develop new or worsening symptoms.     - You must understand that you have received an Urgent Care treatment only and that you may be released before all of your medical problems are known or treated.   - You, the patient, will arrange for follow up care as instructed.   - If your condition worsens or fails to improve we recommend that you receive another evaluation at the ER immediately or contact your PCP to discuss  your concerns or return here.      Elevated Blood Pressure  Your blood pressure was elevated during your visit to the urgent care.  It was not so high that immediate care was needed but it is recommended that you monitor your blood pressure over the next week or two to make sure that it is not staying elevated.  Please have your blood pressure taken 2-3 times daily at different times of the day.  Write all of those blood pressures down and record the time that they were taken.  Keep all that information and take it with you to see your Primary Care Physician.  If your blood pressure is consistently above 140/90 you will need to follow up with your PCP more quickly

## 2025-03-25 NOTE — PROGRESS NOTES
"Subjective:      Patient ID: Cecilia De Los Santos is a 39 y.o. female.    Vitals:  height is 5' 5" (1.651 m) and weight is 95.7 kg (210 lb 15.7 oz). Her oral temperature is 98.6 °F (37 °C). Her blood pressure is 183/83 (abnormal) and her pulse is 94. Her respiration is 18 and oxygen saturation is 99%.     Chief Complaint: Allergic Reaction    Patient is a 39 year old female presenting to clinic with lip swelling and hives. Pt states that this morning the lip swelling began. Later on as the day went on the hives and itching started.   She reports that she tried the new pineapple dragon fruit lemonade at [facility] this morning prior to symptom onset. She reports attempted treatment with Aleve without affect. She reports a similar episodes of hives x 2 months ago, as well as a similar episode of lip swelling after eating a banana nut muffin in the past. She denies nausea, vomiting, throat swelling/throat closing sensation, abdominal pain, cough, SOB, chest pain, trouble swallowing, tongue swelling, or eye swelling.     Allergic Reaction  This is a new problem. The current episode started today. The problem has been gradually worsening since onset. It is unknown what she was exposed to. The time of exposure is not relevant (no exposure). Associated symptoms include itching and a rash. Pertinent negatives include no abdominal pain, chest pain, chest pressure, coughing, diarrhea, difficulty breathing, eye itching, eye redness, eye watering, hyperventilation, skin blistering, trouble swallowing or vomiting. Past treatments include ibuprofen. The treatment provided mild relief. There is no history of food allergies or medication allergies. Swelling is present on the face and lips.       Constitution: Negative for chills, sweating, fatigue and fever.   HENT:  Positive for facial swelling (upper lip). Negative for congestion, sore throat and trouble swallowing.    Neck: Negative for neck pain and neck stiffness. "   Cardiovascular:  Negative for chest pain and leg swelling.   Eyes:  Negative for eye itching, eye pain and eye redness.   Respiratory:  Negative for cough, sputum production and shortness of breath.    Gastrointestinal:  Negative for abdominal pain, nausea, vomiting and diarrhea.   Genitourinary:  Negative for dysuria, frequency and urgency.   Musculoskeletal:  Negative for pain and joint swelling.   Skin:  Positive for rash and hives (back, neck, and arms).   Allergic/Immunologic: Positive for hives (back, neck, and arms) and itching. Negative for food allergies.   Neurological:  Negative for dizziness, headaches, disorientation, altered mental status, numbness and tingling.   Psychiatric/Behavioral:  Negative for altered mental status and disorientation.       Objective:     Physical Exam   Constitutional: She is oriented to person, place, and time. She appears well-developed. She is cooperative.  Non-toxic appearance. She does not appear ill. No distress.   HENT:   Head: Normocephalic and atraumatic.   Ears:   Right Ear: Hearing, tympanic membrane, external ear and ear canal normal.   Left Ear: Hearing, tympanic membrane, external ear and ear canal normal.   Nose: Nose normal. No mucosal edema, rhinorrhea or nasal deformity. No epistaxis.   Mouth/Throat: Uvula is midline, oropharynx is clear and moist and mucous membranes are normal. No trismus in the jaw. Normal dentition. No uvula swelling. No oropharyngeal exudate, posterior oropharyngeal edema or posterior oropharyngeal erythema. No tonsillar exudate.       Upper lip swelling noted on exam. No tongue, throat, or eye lid swelling.       Comments: Upper lip swelling noted on exam. No tongue, throat, or eye lid swelling.   Eyes: Conjunctivae and lids are normal. Right eye exhibits no discharge. Left eye exhibits no discharge. No scleral icterus.   Neck: Trachea normal and phonation normal. Neck supple. No edema present. No erythema present. No neck rigidity  present.   Cardiovascular: Normal rate, regular rhythm, normal heart sounds and normal pulses.   Pulmonary/Chest: Effort normal and breath sounds normal. No accessory muscle usage or stridor. No tachypnea and no bradypnea. No respiratory distress. She has no decreased breath sounds. She has no wheezes. She has no rhonchi. She has no rales.   Ctab. No resp distress.          Comments: Ctab. No resp distress.     Abdominal: Normal appearance.   Musculoskeletal: Normal range of motion.         General: No deformity. Normal range of motion.   Neurological: She is alert and oriented to person, place, and time. She exhibits normal muscle tone. Coordination normal.   Skin: Skin is warm, dry, intact, not diaphoretic, not pale, rash and urticarial.         Comments: Urticaria noted to back, neck, and arms- several pink wheals varying in size.    Psychiatric: Her speech is normal and behavior is normal. Judgment and thought content normal.   Nursing note and vitals reviewed.      Assessment:     1. Hives    2. Lip swelling    3. Allergic reaction, initial encounter    4. Elevated blood pressure reading in office with diagnosis of hypertension        Plan:       Hives  -     Ambulatory referral/consult to Allergy  -     famotidine tablet 20 mg    Lip swelling  -     dexAMETHasone injection 10 mg  -     Ambulatory referral/consult to Allergy    Allergic reaction, initial encounter  -     dexAMETHasone injection 10 mg  -     famotidine tablet 20 mg    Elevated blood pressure reading in office with diagnosis of hypertension      Pt with left upper lip swelling and hives. Likely allergic etiology and will initiate treatment for this. New drink, no other new known exposures. Discussed ddx, home care, tx options, and given follow up precautions.  I have reviewed the patient's chart to view previous visits, labs, and imaging to assess PMH and look for any trends or previous treatments.  ER precautions given, including increased  swelling, trouble swallowing, or difficulty breathing. Patient's BP elevated in clinic, likely due to not taking blood pressure medication amlodipine today- this has been called into pharmacy but has not picked it up yet. Referral to allergy placed, otherwise follow up with PCP. Patient verbalized understanding and agrees with current plan of care. Benadryl not given in clinic because patient concern for drowsiness as she needs to [place] to  [place]. She has had these symptoms for several hours, since this morning, and there is no worsening in clinic, I recommended to take benadryl tonight and take otc zyrtec asap. Pt was monitored for 40 minutes to ensure no worsening.

## 2025-04-15 ENCOUNTER — TELEPHONE (OUTPATIENT)
Dept: OBSTETRICS AND GYNECOLOGY | Facility: CLINIC | Age: 40
End: 2025-04-15
Payer: COMMERCIAL

## 2025-04-15 DIAGNOSIS — D25.1 FIBROIDS, INTRAMURAL: Primary | ICD-10-CM

## 2025-04-15 NOTE — TELEPHONE ENCOUNTER
Pt has been having leg pain. Pt states she is concerned it may be from her fibroids as another dr stated this could be it.     Pt cycle is really heavy, spotting lasts for a while after.     Pt is scheduled for annual with Mary Jane KUMAR and fibroid consult with  on 6/26.     No imaging. U/S?

## 2025-04-15 NOTE — TELEPHONE ENCOUNTER
----- Message from Delphine sent at 4/15/2025  9:57 AM CDT -----  Type:  Needs Medical AdviceWho Called:  pt Would the patient rather a call back or a response via [facility]?  Call back Best Call Back Number:  [phone] Information: pt would like to est care /annual /Fibroid issues

## 2025-04-21 ENCOUNTER — OFFICE VISIT (OUTPATIENT)
Dept: OBSTETRICS AND GYNECOLOGY | Facility: CLINIC | Age: 40
End: 2025-04-21
Payer: COMMERCIAL

## 2025-04-21 VITALS
SYSTOLIC BLOOD PRESSURE: 170 MMHG | DIASTOLIC BLOOD PRESSURE: 92 MMHG | HEART RATE: 79 BPM | WEIGHT: 184.5 LBS | BODY MASS INDEX: 30.71 KG/M2

## 2025-04-21 DIAGNOSIS — N89.8 VAGINAL ODOR: ICD-10-CM

## 2025-04-21 DIAGNOSIS — Z76.89 ENCOUNTER TO ESTABLISH CARE WITH NEW DOCTOR: ICD-10-CM

## 2025-04-21 DIAGNOSIS — R82.90 CLOUDY URINE: ICD-10-CM

## 2025-04-21 DIAGNOSIS — Z80.3 FH: BREAST CANCER IN FIRST DEGREE RELATIVE: ICD-10-CM

## 2025-04-21 DIAGNOSIS — N76.0 ACUTE VAGINITIS: ICD-10-CM

## 2025-04-21 DIAGNOSIS — Z01.419 WELL WOMAN EXAM WITH ROUTINE GYNECOLOGICAL EXAM: Primary | ICD-10-CM

## 2025-04-21 DIAGNOSIS — Z11.3 SCREENING EXAMINATION FOR STD (SEXUALLY TRANSMITTED DISEASE): ICD-10-CM

## 2025-04-21 LAB
BILIRUB SERPL-MCNC: NORMAL MG/DL
BLOOD URINE, POC: NORMAL
CLARITY, POC UA: CLEAR
COLOR, POC UA: YELLOW
GLUCOSE UR QL STRIP: NORMAL
KETONES UR QL STRIP: NORMAL
LEUKOCYTE ESTERASE URINE, POC: NORMAL
NITRITE, POC UA: NORMAL
PH, POC UA: 6
PROTEIN, POC: NORMAL
SPECIFIC GRAVITY, POC UA: >1.03
UROBILINOGEN, POC UA: 1

## 2025-04-21 PROCEDURE — 99214 OFFICE O/P EST MOD 30 MIN: CPT | Mod: 25,S$GLB,,

## 2025-04-21 PROCEDURE — 87624 HPV HI-RISK TYP POOLED RSLT: CPT

## 2025-04-21 PROCEDURE — 3077F SYST BP >= 140 MM HG: CPT | Mod: CPTII,S$GLB,,

## 2025-04-21 PROCEDURE — 1160F RVW MEDS BY RX/DR IN RCRD: CPT | Mod: CPTII,S$GLB,,

## 2025-04-21 PROCEDURE — 81002 URINALYSIS NONAUTO W/O SCOPE: CPT | Mod: S$GLB,,,

## 2025-04-21 PROCEDURE — 88175 CYTOPATH C/V AUTO FLUID REDO: CPT

## 2025-04-21 PROCEDURE — 87591 N.GONORRHOEAE DNA AMP PROB: CPT

## 2025-04-21 PROCEDURE — 3008F BODY MASS INDEX DOCD: CPT | Mod: CPTII,S$GLB,,

## 2025-04-21 PROCEDURE — 99395 PREV VISIT EST AGE 18-39: CPT | Mod: S$GLB,,,

## 2025-04-21 PROCEDURE — 1159F MED LIST DOCD IN RCRD: CPT | Mod: CPTII,S$GLB,,

## 2025-04-21 PROCEDURE — 3080F DIAST BP >= 90 MM HG: CPT | Mod: CPTII,S$GLB,,

## 2025-04-21 PROCEDURE — 99999 PR PBB SHADOW E&M-EST. PATIENT-LVL III: CPT | Mod: PBBFAC,,,

## 2025-04-21 NOTE — PROGRESS NOTES
"SUBJECTIVE:   39 y.o. female  presents for annual well woman exam and multiple concerns.   Patient's last menstrual period was 2025.. Age of first menarche: 12. Periods are regular, heavy, lasting 7 days.     Reports history of fibroids, last US was last year with Corey to evaluate leg pain. Patient reports that she was told fibroids slightly increased in size and was offered a procedure. She never followed through with procedure due to insurance, unknown which procedure. She would like evaluation of fibroids.  Reports periods prior to Oct 2024 were heavy with large clots. She was changing largest pads every 30 min with accidents.   She reports losing a total of 30 lbs since last year by taking a unknown "tik tok medication" that helped her use the bathroom. She was also doing smoothie/juice diets. She is no longer taking medication or doing the diets. Reports that her periods and symptoms have improved significantly since weight loss. Periods are currently monthly, heavy with clots, but reports no more accidents. She changes largest pad every 1-2 hours.   She has an US to be performed and visit with Dr. Perrin to further discuss management.   She has never had anemia, blood/iron transfusions.    Reports having cloudy yellow urine the past few months. Denies any dysuria, hematuria, fever, chills, flank pain. She reports staying hydrated drinking about 4 water bottles per day.   She does not have a PCP and would like to establish care.    Reports vaginal odor x 1 week. She has a history of yeast/BV. She uses "fruity" washes directly to vagina.  Is currently sexually active with one male. BTL for contraception. Would like STD testing with blood work.    History of endometrial polyp s/p D&C.     Denies domestic violence.        Pap-reports pap was performed last year, states it was abnormal, results not on file  -History of abnormal pap-yes  [facility] [id number] model is estimated at 23.4%, " MRI  Family history of breast cancer-mom in 30's, maternal aunt x3 in 40's, and MGM in 60's. Unknown genetic testing.   She has not seen high risk breast clinic and has never had MRI of breast.                   Past Medical History:   Diagnosis Date    Gestational diabetes     Hypertension     PCOS (polycystic ovarian syndrome)      Past Surgical History:   Procedure Laterality Date    TUBAL LIGATION       Social History[1]  Family History   Problem Relation Name Age of Onset    Breast cancer Maternal Grandmother      No Known Problems Father      Breast cancer Mother      No Known Problems Brother      No Known Problems Sister      Breast cancer Maternal Aunt      Breast cancer Maternal Aunt      Breast cancer Maternal Aunt       OB History    Para Term  AB Living   4 3 3  1 3   SAB IAB Ectopic Multiple Live Births             # Outcome Date GA Lbr Mayur/2nd Weight Sex Type Anes PTL Lv   4 Term            3 Term            2 Term            1 AB                  Current Medications[2]  Allergies: Patient has no known allergies.     ROS:  Constitutional: no unintentional weight loss, fever, fatigue  Eyes:  No vision changes, glasses/contacts  ENT/Mouth: No ulcers, sinus problems, ears ringing, headache  Cardiovascular: No inability to lie flat, chest pain, exercise intolerance, swelling, heart palpitations  Respiratory: No wheezing, coughing blood, shortness of breath, or cough  Gastrointestinal: No diarrhea, bloody stool, nausea/vomiting, constipation, gas  Genitourinary: See HPI  Musculoskeletal: No muscle weakness  Skin/Breast: No painful breasts, nipple discharge, masses, rash, ulcers  Neurological: No passing out, seizures, numbness, headache  Endocrine: No hot flashes, hair loss, abnormal hair growth, ance  Psychiatric: No depression, crying  Hematologic: No bruises, bleeding, swollen lymph nodes, anemia.      OBJECTIVE:   The patient appears well, alert, oriented x 3, in no distress.  BP (!)  170/92 (Patient Position: Sitting)   Pulse 79   Wt 83.7 kg (184 lb 8.4 oz)   LMP 03/25/2025   BMI 30.71 kg/m²   NECK: no thyromegaly, trachea midline  SKIN: no acne, striae, hirsutism  BREAST EXAM: breasts appear normal, no suspicious masses, no skin or nipple changes or axillary nodes  ABDOMEN: no hernias, masses, or hepatosplenomegaly  GENITALIA: normal external genitalia, no erythema, no discharge  URETHRA: normal urethra, normal urethral meatus  VAGINA: Normal, no discharge noted  CERVIX: no lesions or cervical motion tenderness  UTERUS: uterus enlarged, mobile, tender      ASSESSMENT:   Diagnoses and all orders for this visit:    Well woman exam with routine gynecological exam  -     Liquid-Based Pap Smear, Screening    Screening examination for STD (sexually transmitted disease)  -     C. trachomatis/N. gonorrhoeae by AMP DNA  -     Vaginosis Screen by DNA Probe  -     HIV 1/2 Ag/Ab (4th Gen); Future  -     Treponema Pallidium Antibodies IgG, IgM; Future    Vaginal odor  -     Vaginosis Screen by DNA Probe    Acute vaginitis  -     Vaginosis Screen by DNA Probe    Cloudy urine  -     POCT URINE DIPSTICK WITHOUT MICROSCOPE  -     Cancel: POCT Urine Pregnancy    Encounter to establish care with new doctor  -     Ambulatory referral/consult to Internal Medicine; Future    FH: breast cancer in first degree relative  -     Ambulatory referral/consult to High Risk Clinic (ST); Future        Orders Placed This Encounter   Procedures    C. trachomatis/N. gonorrhoeae by AMP DNA    Vaginosis Screen by DNA Probe    HIV 1/2 Ag/Ab (4th Gen)    Treponema Pallidium Antibodies IgG, IgM    Ambulatory referral/consult to Internal Medicine    Ambulatory referral/consult to High Risk Clinic ([facility])    POCT URINE DIPSTICK WITHOUT MICROSCOPE     Urine dip negative for infection  F/u with PCP for routine labs and care  F/u with high risk breast clinic-6 month alternating mammograms, MRI, and genetic testing   She is not  interested in any medication to help heavy bleeding at this time.  Follow up in 1 year for annual exam or as needed.  Mary Jane Renee PA-C         [1]   Social History  Socioeconomic History    Marital status: Single   Tobacco Use    Smoking status: Never     Passive exposure: Never    Smokeless tobacco: Never   Substance and Sexual Activity    Alcohol use: No     Comment: occasionally    Drug use: No    Sexual activity: Yes     Partners: Male     Birth control/protection: None, See Surgical Hx     Social Drivers of Health     Financial Resource Strain: Medium Risk (11/30/2023)    Overall Financial Resource Strain (CARDIA)     Difficulty of Paying Living Expenses: Somewhat hard   Food Insecurity: Food Insecurity Present (11/30/2023)    Hunger Vital Sign     Worried About Running Out of Food in the Last Year: Sometimes true     Ran Out of Food in the Last Year: Sometimes true   Transportation Needs: No Transportation Needs (11/30/2023)    PRAPARE - Transportation     Lack of Transportation (Medical): No     Lack of Transportation (Non-Medical): No   Physical Activity: Unknown (11/30/2023)    Exercise Vital Sign     Days of Exercise per Week: 0 days   Stress: Stress Concern Present (11/30/2023)    [employer] - Occupational Stress Questionnaire     Feeling of Stress : Very much   Housing Stability: Unknown (11/30/2023)    Housing Stability Vital Sign     Unable to Pay for Housing in the Last Year: No     Unstable Housing in the Last Year: No   [2]   Current Outpatient Medications   Medication Sig Dispense Refill    amLODIPine (NORVASC) 10 MG tablet Take 1 tablet by mouth once daily.      amLODIPine (NORVASC) 5 MG tablet TK 1 T PO D (Patient not taking: Reported on 4/21/2025)  0    fluconazole (DIFLUCAN) 150 MG Tab Take 1 tablet on day 1 then repeat dose in 72 hours if still symptomatic. (Patient not taking: Reported on 4/21/2025) 2 tablet 0    LINZESS 290 mcg Cap capsule Take 290 mcg by  mouth. (Patient not taking: Reported on 4/21/2025)       No current facility-administered medications for this visit.

## 2025-04-22 ENCOUNTER — LAB VISIT (OUTPATIENT)
Dept: LAB | Facility: HOSPITAL | Age: 40
End: 2025-04-22
Payer: COMMERCIAL

## 2025-04-22 DIAGNOSIS — Z11.3 SCREENING EXAMINATION FOR STD (SEXUALLY TRANSMITTED DISEASE): ICD-10-CM

## 2025-04-22 LAB
HIV 1+2 AB+HIV1 P24 AG SERPL QL IA: NORMAL
T PALLIDUM IGG+IGM SER QL: NORMAL

## 2025-04-22 PROCEDURE — 86593 SYPHILIS TEST NON-TREP QUANT: CPT

## 2025-04-22 PROCEDURE — 87389 HIV-1 AG W/HIV-1&-2 AB AG IA: CPT

## 2025-04-22 PROCEDURE — 36415 COLL VENOUS BLD VENIPUNCTURE: CPT

## 2025-04-23 ENCOUNTER — RESULTS FOLLOW-UP (OUTPATIENT)
Dept: OBSTETRICS AND GYNECOLOGY | Facility: CLINIC | Age: 40
End: 2025-04-23
Payer: COMMERCIAL

## 2025-04-23 ENCOUNTER — HOSPITAL ENCOUNTER (OUTPATIENT)
Dept: RADIOLOGY | Facility: HOSPITAL | Age: 40
Discharge: HOME | End: 2025-04-23
Attending: OBSTETRICS & GYNECOLOGY
Payer: COMMERCIAL

## 2025-04-23 DIAGNOSIS — D25.1 FIBROIDS, INTRAMURAL: ICD-10-CM

## 2025-04-23 PROCEDURE — 76830 TRANSVAGINAL US NON-OB: CPT | Mod: 26,,, | Performed by: RADIOLOGY

## 2025-04-23 PROCEDURE — 76856 US EXAM PELVIC COMPLETE: CPT | Mod: 26,,, | Performed by: RADIOLOGY

## 2025-04-23 PROCEDURE — 76856 US EXAM PELVIC COMPLETE: CPT | Mod: TC

## 2025-04-24 LAB
C TRACH DNA SPEC QL NAA+PROBE: NOT DETECTED
CTGC SOURCE (OHS) ORD-325: NORMAL
N GONORRHOEA DNA UR QL NAA+PROBE: NOT DETECTED

## 2025-04-28 ENCOUNTER — NURSE TRIAGE (OUTPATIENT)
Dept: ADMINISTRATIVE | Facility: CLINIC | Age: 40
End: 2025-04-28
Payer: COMMERCIAL

## 2025-04-28 NOTE — TELEPHONE ENCOUNTER
Call transferred from patient access. Patient states she has had an allergic reaction to breakfast at [employer]. Thought it was the drink she was drinking to this am she did not drink the drink. She is eating the same biscuit and thinks that may be it. 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 Triage done- dispo see provider today. Unable to book appointment for today or tomorrow. Advised , states she has gone to  several times and they just tell her it is an allergy.   Reason for Disposition   [1] Mild lip swelling from food reaction AND [2] diagnosis never confirmed by a doctor (or NP/PA)    Additional Information   Lip swelling is main symptom   Negative: Unresponsive, passed out or very weak   Negative: Swollen tongue   Negative: Difficulty breathing or wheezing   Negative: [1] Life-threatening reaction in the past to similar substance (e.g., food, insect bite/sting, chemical, etc.) AND [2] < 2 hours since exposure   Negative: Sounds like a life-threatening emergency to the triager   Negative: Followed a lip injury   Negative: Entire face is swollen   Negative: [1] Severe swelling AND [2] cause unknown   Negative: Patient sounds very sick or weak to the triager   Negative: [1] Swelling is red AND [2] fever   Negative: [1] Swelling is painful to touch AND [2] fever   Negative: [1] Looks infected (e.g., spreading redness, pus) AND [2] large red area (> 2 in. or 5 cm)   Negative: Toothache   Negative: Taking an ACE Inhibitor medicine (e.g., benazepril / LOTENSIN, captopril / CAPOTEN, enalapril / VASOTEC, lisinopril / ZESTRIL)   Negative: Swelling is painful to touch   Negative: Looks like a boil or infected sore    Protocols used: Allergic Reactions - Guideline [id number], Lip Swelling-A-

## 2025-04-29 ENCOUNTER — RESULTS FOLLOW-UP (OUTPATIENT)
Dept: OBSTETRICS AND GYNECOLOGY | Facility: CLINIC | Age: 40
End: 2025-04-29

## 2025-04-29 RX ORDER — METRONIDAZOLE 500 MG/1
500 TABLET ORAL EVERY 12 HOURS
Qty: 14 TABLET | Refills: 0 | Status: SHIPPED | OUTPATIENT
Start: 2025-04-29

## 2025-04-30 PROBLEM — I10 HYPERTENSION: Status: ACTIVE | Noted: 2024-06-26

## 2025-06-26 ENCOUNTER — OFFICE VISIT (OUTPATIENT)
Dept: OBSTETRICS AND GYNECOLOGY | Facility: CLINIC | Age: 40
End: 2025-06-26
Payer: MEDICAID

## 2025-06-26 ENCOUNTER — LAB VISIT (OUTPATIENT)
Dept: LAB | Facility: HOSPITAL | Age: 40
End: 2025-06-26
Attending: OBSTETRICS & GYNECOLOGY
Payer: MEDICAID

## 2025-06-26 VITALS
BODY MASS INDEX: 30.89 KG/M2 | WEIGHT: 185.63 LBS | SYSTOLIC BLOOD PRESSURE: 167 MMHG | DIASTOLIC BLOOD PRESSURE: 95 MMHG

## 2025-06-26 DIAGNOSIS — N94.6 DYSMENORRHEA: ICD-10-CM

## 2025-06-26 DIAGNOSIS — D25.1 FIBROIDS, INTRAMURAL: ICD-10-CM

## 2025-06-26 DIAGNOSIS — N92.1 MENORRHAGIA WITH IRREGULAR CYCLE: ICD-10-CM

## 2025-06-26 DIAGNOSIS — N92.0 MENORRHAGIA WITH REGULAR CYCLE: ICD-10-CM

## 2025-06-26 DIAGNOSIS — Z80.3 FAMILY HISTORY OF BREAST CANCER IN FIRST DEGREE RELATIVE: ICD-10-CM

## 2025-06-26 DIAGNOSIS — N92.1 MENORRHAGIA WITH IRREGULAR CYCLE: Primary | ICD-10-CM

## 2025-06-26 LAB
ABSOLUTE EOSINOPHIL (OHS): 0.03 K/UL
ABSOLUTE MONOCYTE (OHS): 0.53 K/UL (ref 0.3–1)
ABSOLUTE NEUTROPHIL COUNT (OHS): 1.46 K/UL (ref 1.8–7.7)
BASOPHILS # BLD AUTO: 0 K/UL
BASOPHILS NFR BLD AUTO: 0 %
ERYTHROCYTE [DISTWIDTH] IN BLOOD BY AUTOMATED COUNT: 13.5 % (ref 11.5–14.5)
HCT VFR BLD AUTO: 37.2 % (ref 37–48.5)
HGB BLD-MCNC: 11.6 GM/DL (ref 12–16)
IMM GRANULOCYTES # BLD AUTO: 0.01 K/UL (ref 0–0.04)
IMM GRANULOCYTES NFR BLD AUTO: 0.2 % (ref 0–0.5)
LYMPHOCYTES # BLD AUTO: 2.13 K/UL (ref 1–4.8)
MCH RBC QN AUTO: 25 PG (ref 27–31)
MCHC RBC AUTO-ENTMCNC: 31.2 G/DL (ref 32–36)
MCV RBC AUTO: 80 FL (ref 82–98)
NUCLEATED RBC (/100WBC) (OHS): 0 /100 WBC
PLATELET # BLD AUTO: 270 K/UL (ref 150–450)
PMV BLD AUTO: 10.8 FL (ref 9.2–12.9)
RBC # BLD AUTO: 4.64 M/UL (ref 4–5.4)
RELATIVE EOSINOPHIL (OHS): 0.7 %
RELATIVE LYMPHOCYTE (OHS): 51.2 % (ref 18–48)
RELATIVE MONOCYTE (OHS): 12.7 % (ref 4–15)
RELATIVE NEUTROPHIL (OHS): 35.2 % (ref 38–73)
WBC # BLD AUTO: 4.16 K/UL (ref 3.9–12.7)

## 2025-06-26 PROCEDURE — 36415 COLL VENOUS BLD VENIPUNCTURE: CPT

## 2025-06-26 PROCEDURE — 3080F DIAST BP >= 90 MM HG: CPT | Mod: CPTII,,, | Performed by: OBSTETRICS & GYNECOLOGY

## 2025-06-26 PROCEDURE — 99213 OFFICE O/P EST LOW 20 MIN: CPT | Mod: PBBFAC,PO | Performed by: OBSTETRICS & GYNECOLOGY

## 2025-06-26 PROCEDURE — 99215 OFFICE O/P EST HI 40 MIN: CPT | Mod: S$PBB,,, | Performed by: OBSTETRICS & GYNECOLOGY

## 2025-06-26 PROCEDURE — 3077F SYST BP >= 140 MM HG: CPT | Mod: CPTII,,, | Performed by: OBSTETRICS & GYNECOLOGY

## 2025-06-26 PROCEDURE — 85025 COMPLETE CBC W/AUTO DIFF WBC: CPT

## 2025-06-26 PROCEDURE — 3008F BODY MASS INDEX DOCD: CPT | Mod: CPTII,,, | Performed by: OBSTETRICS & GYNECOLOGY

## 2025-06-26 PROCEDURE — 99999 PR PBB SHADOW E&M-EST. PATIENT-LVL III: CPT | Mod: PBBFAC,,, | Performed by: OBSTETRICS & GYNECOLOGY

## 2025-06-26 PROCEDURE — 1159F MED LIST DOCD IN RCRD: CPT | Mod: CPTII,,, | Performed by: OBSTETRICS & GYNECOLOGY

## 2025-06-26 RX ORDER — NORETHINDRONE 0.35 MG/1
1 TABLET ORAL DAILY
Qty: 30 TABLET | Refills: 11 | Status: SHIPPED | OUTPATIENT
Start: 2025-06-26 | End: 2026-06-26

## 2025-06-26 NOTE — PROGRESS NOTES
CC: Symptoms related to fibroids    Cecilia De Los Santos is a 39 y.o. female  presents for a consultation for management of fibroids.      She reports cycles are heavy and irregular. She usually uses pads that she has to change every 3-4 hours. She also reports clots. She reports cycles have become very painful.     Ultrasound shows:    FINDINGS:  Uterus:     Size: 8.2 x 6.1 x 6.2 cm     Masses: Two measured intramural fibroids up to 2.4 cm and 2.6 cm, respectively.     Endometrium: Upper normal range endometrial thickness in this reported pre-menopausal patient at 14 mm.     Right ovary:     Size: 3.8 x 5.2 x 2.0 cm     Appearance: Dominant anechoic follicle or cyst measuring up to 2.1 cm.     Vascular flow: Present     Left ovary:     Size: 3.2 x 2.7 x 2.1 cm     Appearance: Unremarkable     Vascular Flow: Present     Free Fluid:     Small volume free fluid, likely physiologic.     Impression:     Uterine fibroids.     Upper normal range endometrial thickness in this reported pre-menopausal patient.  To correlate with cycle history.        Past Medical History:   Diagnosis Date    Gestational diabetes     Hypertension     PCOS (polycystic ovarian syndrome)        Past Surgical History:   Procedure Laterality Date    TUBAL LIGATION         Family History   Problem Relation Name Age of Onset    Breast cancer Maternal Grandmother      Cancer Father      Breast cancer Mother      No Known Problems Brother      No Known Problems Sister      Breast cancer Maternal Aunt      Breast cancer Maternal Aunt      Breast cancer Maternal Aunt      Colon cancer Neg Hx      Ovarian cancer Neg Hx         Social History[1]    OB History    Para Term  AB Living   4 3 3  1 3   SAB IAB Ectopic Multiple Live Births       3      # Outcome Date GA Lbr Mayur/2nd Weight Sex Type Anes PTL Lv   4 Term      Vag-Spont   SANJU   3 Term      Vag-Spont   SANJU   2 Term      Vag-Spont   SANJU   1 AB                BP (!) 167/95   Wt 84.2  kg (185 lb 10 oz)   LMP 06/24/2025   BMI 30.89 kg/m²     ROS:  GENERAL: Denies weight gain or weight loss. Feeling well overall.   SKIN: Denies rash or lesions.   HEAD: Denies head injury or headache.   NODES: Denies enlarged lymph nodes.   CHEST: Denies chest pain or shortness of breath.   CARDIOVASCULAR: Denies palpitations or left sided chest pain.   ABDOMEN: No abdominal pain, constipation, diarrhea, nausea, vomiting or rectal bleeding.   URINARY: No frequency, dysuria, hematuria, or burning on urination.  REPRODUCTIVE: See HPI.   HEMATOLOGIC: No easy bruisability or excessive bleeding with the exception of menstrual cycles.  MUSCULOSKELETAL: Denies joint pain or swelling.   NEUROLOGIC: Denies syncope or weakness.   PSYCHIATRIC: Denies depression, anxiety or mood swings.    PHYSICAL EXAM:  APPEARANCE: Well nourished, well developed, in no acute distress.  AFFECT: WNL, alert and oriented x 3  SKIN: No acne or hirsutism  NECK: Neck symmetric without masses or thyromegaly  NODES: No inguinal, cervical, axillary, or femoral lymph node enlargement  CHEST: Good respiratory effect  ABDOMEN: Soft.  No tenderness or masses.  No hepatosplenomegaly.  No hernias.  PELVIC: Normal external genitalia without lesions.  Normal hair distribution.  Adequate perineal body, normal urethral meatus.  Vagina moist and well rugated without lesions or discharge.  Cervix pink, without lesions, discharge or tenderness.  No significant cystocele or rectocele.  Bimanual exam shows uterus to be normal size, regular, mobile and nontender.  Adnexa without masses or tenderness.    EXTREMITIES: No edema.      ICD-10-CM ICD-9-CM    1. Menorrhagia with irregular cycle  N92.1 626.2 CBC Auto Differential      norethindrone (MICRONOR) 0.35 mg tablet      2. Family history of breast cancer in first degree relative  Z80.3 V16.3 Mammo Digital Screening Bilat      3. Menorrhagia with regular cycle  N92.0 626.2       4. Dysmenorrhea  N94.6 625.3       5.  Fibroids, intramural  D25.1 218.1             Patient was counseled today on menorrhagia and dysmenorrhea. Discussed causes of AUB - PALM COEIN. Recommended a CBC to evaluate for anemia. Discussed treatment options - progesterone OCP, endometrial ablation, hysterectomy. Patient desires OCP. Rx sent.    RTC in 3 months to re-evaluate symptoms.                [1]   Social History  Tobacco Use    Smoking status: Never     Passive exposure: Never    Smokeless tobacco: Never   Substance Use Topics    Alcohol use: No     Comment: occasionally    Drug use: Never

## 2025-06-28 ENCOUNTER — RESULTS FOLLOW-UP (OUTPATIENT)
Dept: OBSTETRICS AND GYNECOLOGY | Facility: CLINIC | Age: 40
End: 2025-06-28
Payer: MEDICAID

## 2025-07-14 ENCOUNTER — HOSPITAL ENCOUNTER (OUTPATIENT)
Dept: RADIOLOGY | Facility: HOSPITAL | Age: 40
Discharge: HOME | End: 2025-07-14
Attending: OBSTETRICS & GYNECOLOGY
Payer: MEDICAID

## 2025-07-14 DIAGNOSIS — Z80.3 FAMILY HISTORY OF BREAST CANCER IN FIRST DEGREE RELATIVE: ICD-10-CM

## 2025-07-14 PROCEDURE — 77067 SCR MAMMO BI INCL CAD: CPT | Mod: TC

## 2025-07-24 ENCOUNTER — PATIENT MESSAGE (OUTPATIENT)
Dept: OBSTETRICS AND GYNECOLOGY | Facility: CLINIC | Age: 40
End: 2025-07-24
Payer: MEDICAID

## 2025-07-29 ENCOUNTER — OFFICE VISIT (OUTPATIENT)
Dept: OBSTETRICS AND GYNECOLOGY | Facility: CLINIC | Age: 40
End: 2025-07-29
Payer: MEDICAID

## 2025-07-29 ENCOUNTER — LAB VISIT (OUTPATIENT)
Dept: LAB | Facility: HOSPITAL | Age: 40
End: 2025-07-29
Attending: OBSTETRICS & GYNECOLOGY
Payer: MEDICAID

## 2025-07-29 VITALS
WEIGHT: 188.69 LBS | SYSTOLIC BLOOD PRESSURE: 162 MMHG | HEART RATE: 94 BPM | DIASTOLIC BLOOD PRESSURE: 89 MMHG | BODY MASS INDEX: 31.4 KG/M2

## 2025-07-29 DIAGNOSIS — Z11.3 SCREENING EXAMINATION FOR STD (SEXUALLY TRANSMITTED DISEASE): ICD-10-CM

## 2025-07-29 DIAGNOSIS — K59.00 CONSTIPATION, UNSPECIFIED CONSTIPATION TYPE: ICD-10-CM

## 2025-07-29 DIAGNOSIS — N89.8 VAGINAL ODOR: ICD-10-CM

## 2025-07-29 DIAGNOSIS — R53.83 FATIGUE, UNSPECIFIED TYPE: ICD-10-CM

## 2025-07-29 DIAGNOSIS — Z11.3 SCREENING EXAMINATION FOR STD (SEXUALLY TRANSMITTED DISEASE): Primary | ICD-10-CM

## 2025-07-29 LAB
ABSOLUTE EOSINOPHIL (OHS): 0.01 K/UL
ABSOLUTE MONOCYTE (OHS): 0.56 K/UL (ref 0.3–1)
ABSOLUTE NEUTROPHIL COUNT (OHS): 2.48 K/UL (ref 1.8–7.7)
BASOPHILS # BLD AUTO: 0.01 K/UL
BASOPHILS NFR BLD AUTO: 0.2 %
EAG (OHS): 108 MG/DL (ref 68–131)
ERYTHROCYTE [DISTWIDTH] IN BLOOD BY AUTOMATED COUNT: 13.3 % (ref 11.5–14.5)
HBA1C MFR BLD: 5.4 % (ref 4–5.6)
HBV SURFACE AB SER-ACNC: 5.43 MIU/ML
HBV SURFACE AB SERPL IA-ACNC: NORMAL M[IU]/ML
HBV SURFACE AG SERPL QL IA: NORMAL
HCT VFR BLD AUTO: 34.5 % (ref 37–48.5)
HCV AB SERPL QL IA: NORMAL
HGB BLD-MCNC: 11 GM/DL (ref 12–16)
HIV 1+2 AB+HIV1 P24 AG SERPL QL IA: NORMAL
IMM GRANULOCYTES # BLD AUTO: 0 K/UL (ref 0–0.04)
IMM GRANULOCYTES NFR BLD AUTO: 0 % (ref 0–0.5)
LYMPHOCYTES # BLD AUTO: 2.11 K/UL (ref 1–4.8)
MCH RBC QN AUTO: 25.6 PG (ref 27–31)
MCHC RBC AUTO-ENTMCNC: 31.9 G/DL (ref 32–36)
MCV RBC AUTO: 80 FL (ref 82–98)
NUCLEATED RBC (/100WBC) (OHS): 0 /100 WBC
PLATELET # BLD AUTO: 263 K/UL (ref 150–450)
PMV BLD AUTO: 11.2 FL (ref 9.2–12.9)
RBC # BLD AUTO: 4.29 M/UL (ref 4–5.4)
RELATIVE EOSINOPHIL (OHS): 0.2 %
RELATIVE LYMPHOCYTE (OHS): 40.8 % (ref 18–48)
RELATIVE MONOCYTE (OHS): 10.8 % (ref 4–15)
RELATIVE NEUTROPHIL (OHS): 48 % (ref 38–73)
T PALLIDUM IGG+IGM SER QL: NORMAL
T4 FREE SERPL-MCNC: 1.97 NG/DL (ref 0.71–1.51)
TSH SERPL-ACNC: <0.01 UIU/ML (ref 0.4–4)
WBC # BLD AUTO: 5.17 K/UL (ref 3.9–12.7)

## 2025-07-29 PROCEDURE — 86695 HERPES SIMPLEX TYPE 1 TEST: CPT

## 2025-07-29 PROCEDURE — 1159F MED LIST DOCD IN RCRD: CPT | Mod: CPTII,,, | Performed by: OBSTETRICS & GYNECOLOGY

## 2025-07-29 PROCEDURE — 3008F BODY MASS INDEX DOCD: CPT | Mod: CPTII,,, | Performed by: OBSTETRICS & GYNECOLOGY

## 2025-07-29 PROCEDURE — 3079F DIAST BP 80-89 MM HG: CPT | Mod: CPTII,,, | Performed by: OBSTETRICS & GYNECOLOGY

## 2025-07-29 PROCEDURE — 83036 HEMOGLOBIN GLYCOSYLATED A1C: CPT

## 2025-07-29 PROCEDURE — 3077F SYST BP >= 140 MM HG: CPT | Mod: CPTII,,, | Performed by: OBSTETRICS & GYNECOLOGY

## 2025-07-29 PROCEDURE — 86706 HEP B SURFACE ANTIBODY: CPT

## 2025-07-29 PROCEDURE — 87389 HIV-1 AG W/HIV-1&-2 AB AG IA: CPT

## 2025-07-29 PROCEDURE — 99213 OFFICE O/P EST LOW 20 MIN: CPT | Mod: S$PBB,,, | Performed by: OBSTETRICS & GYNECOLOGY

## 2025-07-29 PROCEDURE — 84443 ASSAY THYROID STIM HORMONE: CPT

## 2025-07-29 PROCEDURE — 85025 COMPLETE CBC W/AUTO DIFF WBC: CPT

## 2025-07-29 PROCEDURE — 86803 HEPATITIS C AB TEST: CPT

## 2025-07-29 PROCEDURE — 87340 HEPATITIS B SURFACE AG IA: CPT

## 2025-07-29 PROCEDURE — 36415 COLL VENOUS BLD VENIPUNCTURE: CPT

## 2025-07-29 PROCEDURE — 99213 OFFICE O/P EST LOW 20 MIN: CPT | Mod: PBBFAC,PO | Performed by: OBSTETRICS & GYNECOLOGY

## 2025-07-29 PROCEDURE — 99999 PR PBB SHADOW E&M-EST. PATIENT-LVL III: CPT | Mod: PBBFAC,,, | Performed by: OBSTETRICS & GYNECOLOGY

## 2025-07-29 PROCEDURE — 86593 SYPHILIS TEST NON-TREP QUANT: CPT

## 2025-07-29 RX ORDER — METRONIDAZOLE 500 MG/1
500 TABLET ORAL
Qty: 14 TABLET | Refills: 0 | Status: SHIPPED | OUTPATIENT
Start: 2025-07-29 | End: 2025-08-05

## 2025-07-29 RX ORDER — FERROUS SULFATE 325(65) MG
325 TABLET, DELAYED RELEASE (ENTERIC COATED) ORAL DAILY
Qty: 30 TABLET | Refills: 12 | Status: SHIPPED | OUTPATIENT
Start: 2025-07-29

## 2025-07-29 RX ORDER — LINACLOTIDE 290 UG/1
290 CAPSULE, GELATIN COATED ORAL
Qty: 30 CAPSULE | Refills: 3 | Status: SHIPPED | OUTPATIENT
Start: 2025-07-29

## 2025-07-29 NOTE — PROGRESS NOTES
40 yo who wants std testing.  Wants everything ordered.  Concerned about vaginal odor.  Currently at the end of her cycle.  Feeling very tired.  Has h/o constipation and wants refills on linzess.  Needs PCP.    AP:   1 - STD testing: everything ordered  2 - Fatigue - cbc, tsh, hemoglobin a 1c, IRON sent  3) Constipation: 3 month supply of linzess sent  4) vaginitis: likely bv - rx for flagyl resent  ]  ARCHANA llanos MD

## 2025-07-30 ENCOUNTER — PATIENT MESSAGE (OUTPATIENT)
Dept: OBSTETRICS AND GYNECOLOGY | Facility: CLINIC | Age: 40
End: 2025-07-30
Payer: MEDICAID

## 2025-07-30 LAB
HSV1 IGG SERPL QL IA: NEGATIVE
HSV2 IGG SERPL QL IA: POSITIVE

## 2025-08-06 ENCOUNTER — OFFICE VISIT (OUTPATIENT)
Dept: FAMILY MEDICINE | Facility: HOSPITAL | Age: 40
End: 2025-08-06
Payer: MEDICAID

## 2025-08-06 VITALS
BODY MASS INDEX: 32.18 KG/M2 | WEIGHT: 193.13 LBS | OXYGEN SATURATION: 98 % | HEART RATE: 82 BPM | HEIGHT: 65 IN | SYSTOLIC BLOOD PRESSURE: 169 MMHG | DIASTOLIC BLOOD PRESSURE: 96 MMHG

## 2025-08-06 DIAGNOSIS — N76.0 BACTERIAL VAGINOSIS: ICD-10-CM

## 2025-08-06 DIAGNOSIS — Z91.018 FOOD ALLERGY: ICD-10-CM

## 2025-08-06 DIAGNOSIS — E05.90 HYPERTHYROIDISM: Primary | ICD-10-CM

## 2025-08-06 DIAGNOSIS — R94.6 ABNORMAL THYROID FUNCTION TEST: ICD-10-CM

## 2025-08-06 DIAGNOSIS — B96.89 BACTERIAL VAGINOSIS: ICD-10-CM

## 2025-08-06 DIAGNOSIS — B00.9 HSV (HERPES SIMPLEX VIRUS) INFECTION: ICD-10-CM

## 2025-08-06 DIAGNOSIS — B97.7 HPV IN FEMALE: ICD-10-CM

## 2025-08-06 DIAGNOSIS — I10 HYPERTENSION, UNSPECIFIED TYPE: ICD-10-CM

## 2025-08-06 PROCEDURE — 99214 OFFICE O/P EST MOD 30 MIN: CPT

## 2025-08-06 RX ORDER — METRONIDAZOLE 500 MG/1
500 TABLET ORAL
Status: CANCELLED | OUTPATIENT
Start: 2025-08-06

## 2025-08-06 RX ORDER — PROPRANOLOL HYDROCHLORIDE 10 MG/1
10 TABLET ORAL 3 TIMES DAILY PRN
Qty: 90 TABLET | Refills: 0 | Status: SHIPPED | OUTPATIENT
Start: 2025-08-06 | End: 2025-08-07

## 2025-08-06 NOTE — PROGRESS NOTES
"  History & Physical  [facility]      SUBJECTIVE:     History of Present Illness:    Patient is a 39 y.o. female with past medical history of HTN, hyperthyroidism, and gestational diabetes who presents to clinic to establish care. She presents after labs indicating hyperthyroidism at her OBGYN visit. Patient states she has been experiencing increased heart rate, racing thoughts, diaphoresis, and hair loss. She also endorses weight gain of approximately 30 pounds in the last three months and psychomotor retardation.  She has not tried taking anything for her symptoms.  No prior history of thyroid disease. ROS positive for tachycardia, anxiety, restlessness, heat intolerance, cold intolerance.  She denies chest pain, SOB, or recent illness. No known family history of thyroid disorders.    She also had recent tests done revealing bacterial vaginosis, HPV, and HSV and wishes to learn more about the meaning and next steps.         Review of patient's allergies indicates:   Allergen Reactions    Dragon fruit Hives       Past Medical History:   Diagnosis Date    Gestational diabetes     Hypertension     PCOS (polycystic ovarian syndrome)      Past Surgical History:   Procedure Laterality Date    TUBAL LIGATION       Family History   Problem Relation Name Age of Onset    Breast cancer Maternal Grandmother      Cancer Father      Breast cancer Mother      No Known Problems Brother      No Known Problems Sister      Breast cancer Maternal Aunt      Breast cancer Maternal Aunt      Breast cancer Maternal Aunt      Colon cancer Neg Hx      Ovarian cancer Neg Hx       Social History[1]     OBJECTIVE:     Vital Signs (Most Recent)  Vitals:    08/06/25 1559   BP: (!) 169/96   Pulse: 82   SpO2: 98%   Weight: 87.6 kg (193 lb 2 oz)   Height: 5' 5" (1.651 m)     BMI: 32.14    Physical Exam:  Gen: Well nourished and developed, NAD, appears stated age  HEENT: exophthalmos, normocephalic, atraumatic, PERRLA, TMs visualized " bilaterally not impacted by cerumen, no nasal septal deviation, oropharynx mucosa pink and moist without tonsillar exudates  Neck: trachea midline, no LAD, thyromegaly  CV: RRR, S1 and S2 present, no murmurs, no LE edema, radial and dorsalis pedis pulses equal and present bilaterally  Resp: breathing comfortably on room air, CTAB, no wheezes or crackles  Abd: Non-distended, BSx4, non-tender  Skin: No rashes or abnormal skin lesions, no apparent jaundice or bruising  Neuro: A&Ox4, sensation intact throughout, spontaneous movements, gait smooth judah wnl, UE and LE strength 5/5 bilaterally, patellar and biceps DTRs 2+/4 bilaterally  MSK: Full ROM of all extremities, no tenderness to palpation of shoulders and knees, no joint edema  Psych: Logical thought process, answers questions appropriately    ROS negative except as listed in HPI.     Laboratory  I have reviewed all pertinent lab results within the past 24 hours.      Diagnostic Results:  See below      ASSESSMENT/PLAN:   39 y.o.female presents to clinic to establish care.     1. Abnormal thyroid function test  - Ambulatory referral/consult to Family Practice    2. Hyperthyroidism  Start Propanolol 10mg three times daily as needed   Patient to follow up with Ultrasound  Ordered hyperthyroid labs including T3, thyrotropin receptor antibody, thyroid stimulating immunoglobulin      3. Hypertension, unspecified type  Primary HTN vs Hyperthyroidism  BP uncontrolled in office at 169/96  Continue Amlodipine 10 mg once daily  Added Propanolol 10mg for Hyperthyroid symptoms which has HTN affects as well    4. Bacterial vaginosis  Patient to take previously prescribed Flagyl x7 days     5. HPV in female  Patient to f/u with OBGYN regarding repeat vs colposcopy  Explained procedure in office     6. HSV (herpes simplex virus) infection  Provided education on HSV and latency  Given no outbreaks currently or in the past, patient advised to discuss with partner possible  treatment with Acyclovir         Follow-up: 2 weeks to discuss thyroid tests     A total of 60 minutes was spent on patient care during this encounter which included chart review, examining the patient, formulating a treatment plan and documentation.      Complex medical decision making performed due to multiple medical problems addressed during the visit.      Medical decision making straight forward and not complex during this visit.     Case discussed with Dr. Au    ________________________  Jer Lee MD  [facility] PGY-1              [1]   Social History  Tobacco Use    Smoking status: Never     Passive exposure: Never    Smokeless tobacco: Never   Substance Use Topics    Alcohol use: No     Comment: occasionally    Drug use: Never

## 2025-08-07 RX ORDER — PROPRANOLOL HYDROCHLORIDE 10 MG/1
10 TABLET ORAL 3 TIMES DAILY PRN
Qty: 90 TABLET | Refills: 0 | Status: SHIPPED | OUTPATIENT
Start: 2025-08-07 | End: 2025-09-06

## 2025-08-29 ENCOUNTER — TELEPHONE (OUTPATIENT)
Dept: HEMATOLOGY/ONCOLOGY | Facility: CLINIC | Age: 40
End: 2025-08-29
Payer: MEDICAID